# Patient Record
Sex: MALE | Race: WHITE | NOT HISPANIC OR LATINO | Employment: OTHER | URBAN - METROPOLITAN AREA
[De-identification: names, ages, dates, MRNs, and addresses within clinical notes are randomized per-mention and may not be internally consistent; named-entity substitution may affect disease eponyms.]

---

## 2018-05-21 ENCOUNTER — HOSPITAL ENCOUNTER (EMERGENCY)
Facility: HOSPITAL | Age: 48
Discharge: HOME | End: 2018-05-21
Attending: EMERGENCY MEDICINE | Admitting: EMERGENCY MEDICINE

## 2018-05-21 ENCOUNTER — APPOINTMENT (OUTPATIENT)
Dept: CT IMAGING | Facility: HOSPITAL | Age: 48
End: 2018-05-21

## 2018-05-21 VITALS
SYSTOLIC BLOOD PRESSURE: 149 MMHG | TEMPERATURE: 98.1 F | RESPIRATION RATE: 16 BRPM | WEIGHT: 165 LBS | HEIGHT: 70 IN | BODY MASS INDEX: 23.62 KG/M2 | HEART RATE: 71 BPM | DIASTOLIC BLOOD PRESSURE: 88 MMHG | OXYGEN SATURATION: 99 %

## 2018-05-21 DIAGNOSIS — R56.9 NEW ONSET SEIZURE (HCC): Primary | ICD-10-CM

## 2018-05-21 LAB
ALBUMIN SERPL-MCNC: 4.6 G/DL (ref 3.5–5.2)
ALBUMIN/GLOB SERPL: 1.6 G/DL
ALP SERPL-CCNC: 65 U/L (ref 39–117)
ALT SERPL W P-5'-P-CCNC: 23 U/L (ref 1–41)
ANION GAP SERPL CALCULATED.3IONS-SCNC: 13 MMOL/L
AST SERPL-CCNC: 13 U/L (ref 1–40)
BACTERIA UR QL AUTO: ABNORMAL /HPF
BASOPHILS # BLD AUTO: 0.02 10*3/MM3 (ref 0–0.2)
BASOPHILS NFR BLD AUTO: 0.2 % (ref 0–1.5)
BILIRUB SERPL-MCNC: 0.4 MG/DL (ref 0.1–1.2)
BILIRUB UR QL STRIP: NEGATIVE
BUN BLD-MCNC: 10 MG/DL (ref 6–20)
BUN/CREAT SERPL: 11.2 (ref 7–25)
CALCIUM SPEC-SCNC: 9.6 MG/DL (ref 8.6–10.5)
CHLORIDE SERPL-SCNC: 103 MMOL/L (ref 98–107)
CLARITY UR: CLEAR
CO2 SERPL-SCNC: 26 MMOL/L (ref 22–29)
COLOR UR: YELLOW
CREAT BLD-MCNC: 0.89 MG/DL (ref 0.76–1.27)
DEPRECATED RDW RBC AUTO: 43.1 FL (ref 37–54)
EOSINOPHIL # BLD AUTO: 0.17 10*3/MM3 (ref 0–0.7)
EOSINOPHIL NFR BLD AUTO: 1.7 % (ref 0.3–6.2)
ERYTHROCYTE [DISTWIDTH] IN BLOOD BY AUTOMATED COUNT: 12.7 % (ref 11.5–14.5)
GFR SERPL CREATININE-BSD FRML MDRD: 92 ML/MIN/1.73
GLOBULIN UR ELPH-MCNC: 2.9 GM/DL
GLUCOSE BLD-MCNC: 106 MG/DL (ref 65–99)
GLUCOSE UR STRIP-MCNC: NEGATIVE MG/DL
HCT VFR BLD AUTO: 43.2 % (ref 40.4–52.2)
HGB BLD-MCNC: 14.5 G/DL (ref 13.7–17.6)
HGB UR QL STRIP.AUTO: NEGATIVE
HYALINE CASTS UR QL AUTO: ABNORMAL /LPF
IMM GRANULOCYTES # BLD: 0.02 10*3/MM3 (ref 0–0.03)
IMM GRANULOCYTES NFR BLD: 0.2 % (ref 0–0.5)
INR PPP: 1.01 (ref 0.9–1.1)
KETONES UR QL STRIP: NEGATIVE
LEUKOCYTE ESTERASE UR QL STRIP.AUTO: ABNORMAL
LYMPHOCYTES # BLD AUTO: 1.06 10*3/MM3 (ref 0.9–4.8)
LYMPHOCYTES NFR BLD AUTO: 10.5 % (ref 19.6–45.3)
MCH RBC QN AUTO: 31.5 PG (ref 27–32.7)
MCHC RBC AUTO-ENTMCNC: 33.6 G/DL (ref 32.6–36.4)
MCV RBC AUTO: 93.7 FL (ref 79.8–96.2)
MONOCYTES # BLD AUTO: 0.7 10*3/MM3 (ref 0.2–1.2)
MONOCYTES NFR BLD AUTO: 7 % (ref 5–12)
NEUTROPHILS # BLD AUTO: 8.08 10*3/MM3 (ref 1.9–8.1)
NEUTROPHILS NFR BLD AUTO: 80.4 % (ref 42.7–76)
NITRITE UR QL STRIP: NEGATIVE
PH UR STRIP.AUTO: <=5 [PH] (ref 5–8)
PLATELET # BLD AUTO: 224 10*3/MM3 (ref 140–500)
PMV BLD AUTO: 11.3 FL (ref 6–12)
POTASSIUM BLD-SCNC: 4.2 MMOL/L (ref 3.5–5.2)
PROT SERPL-MCNC: 7.5 G/DL (ref 6–8.5)
PROT UR QL STRIP: ABNORMAL
PROTHROMBIN TIME: 13.1 SECONDS (ref 11.7–14.2)
RBC # BLD AUTO: 4.61 10*6/MM3 (ref 4.6–6)
RBC # UR: ABNORMAL /HPF
REF LAB TEST METHOD: ABNORMAL
SODIUM BLD-SCNC: 142 MMOL/L (ref 136–145)
SP GR UR STRIP: 1.02 (ref 1–1.03)
SPERM URNS QL MICRO: ABNORMAL /HPF
SQUAMOUS #/AREA URNS HPF: ABNORMAL /HPF
TROPONIN T SERPL-MCNC: <0.01 NG/ML (ref 0–0.03)
UROBILINOGEN UR QL STRIP: ABNORMAL
WBC NRBC COR # BLD: 10.05 10*3/MM3 (ref 4.5–10.7)
WBC UR QL AUTO: ABNORMAL /HPF

## 2018-05-21 PROCEDURE — 25010000002 ONDANSETRON PER 1 MG: Performed by: EMERGENCY MEDICINE

## 2018-05-21 PROCEDURE — 81001 URINALYSIS AUTO W/SCOPE: CPT | Performed by: EMERGENCY MEDICINE

## 2018-05-21 PROCEDURE — 93010 ELECTROCARDIOGRAM REPORT: CPT | Performed by: INTERNAL MEDICINE

## 2018-05-21 PROCEDURE — 70450 CT HEAD/BRAIN W/O DYE: CPT

## 2018-05-21 PROCEDURE — 96374 THER/PROPH/DIAG INJ IV PUSH: CPT

## 2018-05-21 PROCEDURE — 85025 COMPLETE CBC W/AUTO DIFF WBC: CPT | Performed by: EMERGENCY MEDICINE

## 2018-05-21 PROCEDURE — 99285 EMERGENCY DEPT VISIT HI MDM: CPT

## 2018-05-21 PROCEDURE — 87086 URINE CULTURE/COLONY COUNT: CPT | Performed by: EMERGENCY MEDICINE

## 2018-05-21 PROCEDURE — 96375 TX/PRO/DX INJ NEW DRUG ADDON: CPT

## 2018-05-21 PROCEDURE — 80053 COMPREHEN METABOLIC PANEL: CPT | Performed by: EMERGENCY MEDICINE

## 2018-05-21 PROCEDURE — 96361 HYDRATE IV INFUSION ADD-ON: CPT

## 2018-05-21 PROCEDURE — 25010000002 MORPHINE PER 10 MG: Performed by: EMERGENCY MEDICINE

## 2018-05-21 PROCEDURE — 25010000003 LEVETIRACETAM IN NACL 0.75% 1000 MG/100ML SOLUTION: Performed by: EMERGENCY MEDICINE

## 2018-05-21 PROCEDURE — 85610 PROTHROMBIN TIME: CPT | Performed by: EMERGENCY MEDICINE

## 2018-05-21 PROCEDURE — 84484 ASSAY OF TROPONIN QUANT: CPT | Performed by: EMERGENCY MEDICINE

## 2018-05-21 PROCEDURE — 93005 ELECTROCARDIOGRAM TRACING: CPT | Performed by: EMERGENCY MEDICINE

## 2018-05-21 RX ORDER — LEVETIRACETAM 10 MG/ML
1000 INJECTION INTRAVASCULAR ONCE
Status: COMPLETED | OUTPATIENT
Start: 2018-05-21 | End: 2018-05-21

## 2018-05-21 RX ORDER — IBUPROFEN 200 MG
800 TABLET ORAL ONCE
Status: COMPLETED | OUTPATIENT
Start: 2018-05-21 | End: 2018-05-21

## 2018-05-21 RX ORDER — DOCUSATE SODIUM 100 MG/1
100 CAPSULE, LIQUID FILLED ORAL 2 TIMES DAILY
COMMUNITY

## 2018-05-21 RX ORDER — MORPHINE SULFATE 2 MG/ML
2 INJECTION, SOLUTION INTRAMUSCULAR; INTRAVENOUS ONCE
Status: COMPLETED | OUTPATIENT
Start: 2018-05-21 | End: 2018-05-21

## 2018-05-21 RX ORDER — ACETAMINOPHEN 325 MG/1
650 TABLET ORAL EVERY 6 HOURS PRN
COMMUNITY

## 2018-05-21 RX ORDER — FOLIC ACID 1 MG/1
1 TABLET ORAL DAILY
COMMUNITY

## 2018-05-21 RX ORDER — BACLOFEN 10 MG/1
10 TABLET ORAL 3 TIMES DAILY
COMMUNITY

## 2018-05-21 RX ORDER — LABETALOL 200 MG/1
200 TABLET, FILM COATED ORAL 2 TIMES DAILY
COMMUNITY

## 2018-05-21 RX ORDER — ATORVASTATIN CALCIUM 40 MG/1
40 TABLET, FILM COATED ORAL DAILY
COMMUNITY

## 2018-05-21 RX ORDER — SERTRALINE HYDROCHLORIDE 100 MG/1
100 TABLET, FILM COATED ORAL DAILY
COMMUNITY

## 2018-05-21 RX ORDER — TRAZODONE HYDROCHLORIDE 50 MG/1
50 TABLET ORAL NIGHTLY
COMMUNITY

## 2018-05-21 RX ORDER — LEVETIRACETAM 500 MG/1
500 TABLET ORAL 2 TIMES DAILY
Qty: 60 TABLET | Refills: 1 | Status: SHIPPED | OUTPATIENT
Start: 2018-05-21

## 2018-05-21 RX ORDER — AMANTADINE HYDROCHLORIDE 100 MG/1
100 CAPSULE, GELATIN COATED ORAL 2 TIMES DAILY
COMMUNITY

## 2018-05-21 RX ORDER — LOSARTAN POTASSIUM AND HYDROCHLOROTHIAZIDE 25; 100 MG/1; MG/1
1 TABLET ORAL DAILY
COMMUNITY

## 2018-05-21 RX ORDER — UBIDECARENONE 100 MG
100 CAPSULE ORAL DAILY
COMMUNITY

## 2018-05-21 RX ORDER — SENNOSIDES 8.6 MG
TABLET ORAL NIGHTLY
COMMUNITY

## 2018-05-21 RX ORDER — SODIUM CHLORIDE 0.9 % (FLUSH) 0.9 %
10 SYRINGE (ML) INJECTION AS NEEDED
Status: DISCONTINUED | OUTPATIENT
Start: 2018-05-21 | End: 2018-05-21 | Stop reason: HOSPADM

## 2018-05-21 RX ORDER — IBUPROFEN 400 MG/1
400 TABLET ORAL EVERY 6 HOURS PRN
COMMUNITY
End: 2019-07-15

## 2018-05-21 RX ORDER — ONDANSETRON 2 MG/ML
4 INJECTION INTRAMUSCULAR; INTRAVENOUS ONCE
Status: COMPLETED | OUTPATIENT
Start: 2018-05-21 | End: 2018-05-21

## 2018-05-21 RX ADMIN — LEVETIRACETAM 1000 MG: 10 INJECTION INTRAVENOUS at 11:48

## 2018-05-21 RX ADMIN — ONDANSETRON 4 MG: 2 INJECTION INTRAMUSCULAR; INTRAVENOUS at 10:23

## 2018-05-21 RX ADMIN — MORPHINE SULFATE 2 MG: 2 INJECTION, SOLUTION INTRAMUSCULAR; INTRAVENOUS at 10:23

## 2018-05-21 RX ADMIN — IBUPROFEN 800 MG: 200 TABLET, FILM COATED ORAL at 11:26

## 2018-05-21 RX ADMIN — SODIUM CHLORIDE 1000 ML: 9 INJECTION, SOLUTION INTRAVENOUS at 09:49

## 2018-05-21 NOTE — ED TRIAGE NOTES
Patient to er from assisted living with c/o seizure lasting aprox 3 minutes. Patient has hx of seizure and hx of TBI in the past. Patient c/o left chest pain. Patient transported to er per [facility]

## 2018-05-21 NOTE — ED PROVIDER NOTES
EMERGENCY DEPARTMENT ENCOUNTER    CHIEF COMPLAINT  Chief Complaint: Seizure  History given by: Patient  History limited by: None  Room Number: 36/36  PMD: Arturo Flores DO      HPI:  Pt is a 47 y.o. male who presents after experiencing a witnessed 3-minute seizure earlier today. Patient states that following the episode he developed a mild HA and L-sided chest pain that is exacerbated by deep breathing and movement. There are no c/o  incontinence, tongue pain or other symptoms. 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   Treatment before arrival: none stated    PAST MEDICAL HISTORY  Active Ambulatory Problems     Diagnosis Date Noted   • No Active Ambulatory Problems     Resolved Ambulatory Problems     Diagnosis Date Noted   • No Resolved Ambulatory Problems     Past Medical History:   Diagnosis Date   • Hypertension    • Stroke        PAST SURGICAL HISTORY  Past Surgical History:   Procedure Laterality Date   • BRAIN SURGERY         FAMILY HISTORY  History reviewed. No pertinent family history.    SOCIAL HISTORY  Social History     Social History   • Marital status: Single     Spouse name: N/A   • Number of children: N/A   • Years of education: N/A     Occupational History   • Not on file.     Social History Main Topics   • Smoking status: Never Smoker   • Smokeless tobacco: Not on file   • Alcohol use No   • Drug use: Unknown   • Sexual activity: Not on file     Other Topics Concern   • Not on file      Social History Narrative   • No narrative on file       ALLERGIES  Aspartame and phenylalanine    REVIEW OF SYSTEMS  Review of Systems   Constitutional: Negative for activity change, appetite change and fever.   HENT: Negative for congestion and sore throat.         No tongue pain/injury   Eyes: Negative.    Respiratory: Negative for cough and shortness of breath.    Cardiovascular: Positive for chest pain (L-sided chest pain exacerbated by deep breathing and movement). Negative for leg swelling.   Gastrointestinal: Negative for abdominal pain, diarrhea and vomiting.   Endocrine: Negative.    Genitourinary: Negative for decreased urine volume and dysuria.        No incontinence   Musculoskeletal: Negative for neck pain.   Skin: Negative for rash and wound.   Allergic/Immunologic: Negative.    Neurological: Positive for seizures and headaches (mild). Negative for weakness and numbness.   Hematological: Negative.    Psychiatric/Behavioral: Negative.    All other systems reviewed and are negative.      PHYSICAL EXAM  ED Triage Vitals   Temp Heart Rate Resp BP SpO2   05/21/18 0917 05/21/18 0909 05/21/18 0918 05/21/18 0909 05/21/18 0909   98.9 °F (37.2 °C) 91 22 139/90 96 %      Temp src Heart Rate Source Patient Position BP Location FiO2 (%)   05/21/18 0917 -- -- -- --   Tympanic           Physical Exam   Constitutional: He is oriented to person, place, and time and well-developed, well-nourished, and in no distress.   HENT:   Head: Normocephalic and atraumatic.   Eyes: EOM are normal. Pupils are equal, round, and reactive to light.   Neck: Normal range of motion. Neck supple.   Cardiovascular: Normal rate, regular rhythm and normal heart sounds.    No murmur heard.  Pulmonary/Chest: Effort normal and breath sounds normal. No respiratory distress. He exhibits tenderness (reproducible with palpation and worse with flexion of his back).   Abdominal: Soft. There is no tenderness. There is no rebound and no guarding.    Musculoskeletal: Normal range of motion. He exhibits no edema.   Neurological: He is alert and oriented to person, place, and time. He has normal sensation and normal strength.   Skin: Skin is warm and dry.   Psychiatric: Mood and affect normal.   Nursing note and vitals reviewed.      LAB RESULTS  Lab Results (last 24 hours)     Procedure Component Value Units Date/Time    CBC & Differential [[id number]] Collected:  05/21/18 0939    Specimen:  Blood Updated:  05/21/18 0953    Narrative:       The following orders were created for panel order CBC & Differential.  Procedure                               Abnormality         Status                     ---------                               -----------         ------                     CBC Auto Differential[[id number]]        Abnormal            Final result                 Please view results for these tests on the individual orders.    Comprehensive Metabolic Panel [[id number]]  (Abnormal) Collected:  05/21/18 0939    Specimen:  Blood Updated:  05/21/18 1017     Glucose 106 (H) mg/dL      BUN 10 mg/dL      Creatinine 0.89 mg/dL      Sodium 142 mmol/L      Potassium 4.2 mmol/L      Chloride 103 mmol/L      CO2 26.0 mmol/L      Calcium 9.6 mg/dL      Total Protein 7.5 g/dL      Albumin 4.60 g/dL      ALT (SGPT) 23 U/L      AST (SGOT) 13 U/L      Alkaline Phosphatase 65 U/L      Total Bilirubin 0.4 mg/dL      eGFR Non African Amer 92 mL/min/1.73      Globulin 2.9 gm/dL      A/G Ratio 1.6 g/dL      BUN/Creatinine Ratio 11.2     Anion Gap 13.0 mmol/L     Protime-INR [[id number]]  (Normal) Collected:  05/21/18 0939    Specimen:  Blood Updated:  05/21/18 1002     Protime 13.1 Seconds      INR 1.01    Troponin [[id number]]  (Normal) Collected:  05/21/18 0939    Specimen:  Blood Updated:  05/21/18 1017     Troponin T <0.010 ng/mL     Narrative:       Troponin T Reference Ranges:  Less than 0.03 ng/mL:    Negative for AMI  0.03 to 0.09 ng/mL:      Indeterminant for AMI  Greater  than 0.09 ng/mL: Positive for AMI    CBC Auto Differential [[id number]]  (Abnormal) Collected:  05/21/18 0939    Specimen:  Blood Updated:  05/21/18 0953     WBC 10.05 10*3/mm3      RBC 4.61 10*6/mm3      Hemoglobin 14.5 g/dL      Hematocrit 43.2 %      MCV 93.7 fL      MCH 31.5 pg      MCHC 33.6 g/dL      RDW 12.7 %      RDW-SD 43.1 fl      MPV 11.3 fL      Platelets 224 10*3/mm3      Neutrophil % 80.4 (H) %      Lymphocyte % 10.5 (L) %      Monocyte % 7.0 %      Eosinophil % 1.7 %      Basophil % 0.2 %      Immature Grans % 0.2 %      Neutrophils, Absolute 8.08 10*3/mm3      Lymphocytes, Absolute 1.06 10*3/mm3      Monocytes, Absolute 0.70 10*3/mm3      Eosinophils, Absolute 0.17 10*3/mm3      Basophils, Absolute 0.02 10*3/mm3      Immature Grans, Absolute 0.02 10*3/mm3     Urinalysis With / Culture If Indicated - Urine, Clean Catch [[id number]]  (Abnormal) Collected:  05/21/18 1127    Specimen:  Urine from Urine, Clean Catch Updated:  05/21/18 1151     Color, UA Yellow     Appearance, UA Clear     pH, UA <=5.0     Specific Gravity, UA 1.019     Glucose, UA Negative     Ketones, UA Negative     Bilirubin, UA Negative     Blood, UA Negative     Protein, UA Trace (A)     Leuk Esterase, UA Trace (A)     Nitrite, UA Negative     Urobilinogen, UA 0.2 E.U./dL    Urinalysis, Microscopic Only - Urine, Clean Catch [[id number]]  (Abnormal) Collected:  05/21/18 1127    Specimen:  Urine from Urine, Clean Catch Updated:  05/21/18 1200     RBC, UA 3-5 (A) /HPF      WBC, UA 6-12 (A) /HPF      Bacteria, UA None Seen /HPF      Squamous Epithelial Cells, UA 0-2 /HPF      Hyaline Casts, UA 13-20 /LPF      Sperm, UA Moderate/2+ (A) /HPF      Methodology Manual Light Microscopy    Urine Culture - Urine, Urine, Clean Catch [[id number]] Collected:  05/21/18 1127    Specimen:  Urine from Urine, Clean Catch Updated:  05/21/18 1137          I ordered the above labs and reviewed the results    RADIOLOGY  CT Head Without Contrast   Preliminary  Result   1. No acute abnormality is seen to account for the new seizure.   2. This patient has had a prior 4 cm posterior left parietal craniotomy   to by history resect a brain tumor and there is a 2 cm area of   encephalomalacia deep to the craniotomy flap. Furthermore. There is a   moderate size area of cystic encephalomalacia that tracks throughout the   left corona radiata region, the left internal capsule, external capsule,   and lateral left putamen into the superior and anterior left temporal   lobe that tracks up to 6.7 x 1.7 x 4 cm in anterior posterior, medial   lateral, and craniocaudal dimension likely secondary to old areas of   left middle cerebral artery territory infarction. Some of it could   potentially be the sequela of an old hemorrhage, correlate with clinical   history. There is wallerian degeneration white matter tracts tracking   down the left cerebral peduncle, left anterior guzman and medulla. There   is a 6 mm old lacunar infarct in the posterior right putamen.       3. Postsurgical changes inferior to the left orbit with what appears to   be prior resection of the left maxillary sinus and left middle and   inferior turbinectomy and partial left ethmoidectomy. There are multiple   surgical clips and there is some soft tissue thickening at the surgical   site and soft tissue thickening in the left pterygopalatine fossa and of   course areas of residual tumor cannot be excluded. Correlate with   clinical history with regards to this finding. 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   4. The remainder of the head CT is within normal limits. If clinically   indicated, an MRI of the brain with and without contrast could be   obtained for more comprehensive assessment.        The results and recommendations were communicated to Collins Lewis MD, in   the emergency room by telephone  05/21/2018 at 10:30 AM.       Radiation dose reduction techniques were utilized, including automated   exposure control and exposure modulation based on body size.                   I ordered the above noted radiological studies. Interpreted by radiologist. Reviewed by me in PACS.       PROCEDURES  Procedures    EKG           EKG time: 0935  Rhythm/Rate: NSR rate77  P waves and MO: nml  QRS, axis: nml   ST and T waves: nml     Interpreted Contemporaneously by me, independently viewed  No previous ECG for comparison      PROGRESS AND CONSULTS      9:30 AM   Ordered IVF for hydration.     10:02 AM  Ordered zofran for nausea/vomiting prevention and morphine for chest pain.     11:13 AM  Discussed patient's case with Dr. Valiente, [facility], including concerns regarding patient's h/o aphasia and basal ganglia as well as presentation today s/p seizure and pertinent workup which is not acute. He is not convinced that patient had a seizure since CO2 is normal and patient has had no postictal symptoms. He recommends speaking to neurology to see if patient requires admission. I will reevaluate.    11:35 AM  Discussed patient's case with Dr. Aguilar, neurology, including concerns regarding patient's h/o brain surgery and presentation with seizure (including specifics of episode and post-ictal activity). He recommends starting patient on keppra and discharging to f/u with PCP.     11:47 AM  Rechecked patient who is resting comfortably in NAD and with stable VS. Informed patient of pertinent workup which shows prior infarct but nothing acute. Discussed plan developed with neurology to d/c with a prescription for keppra. Due to patient's h/o stroke, it is not unlikely for him to experience seizures. Pt understands and agrees with the plan. All questions answered.    MEDICAL DECISION MAKING  Results were reviewed/discussed with the patient and they were also made aware of online access. Pt also made aware that some labs, such as cultures,  will not be resulted during ER visit and follow up with PMD is necessary.     MDM  Number of Diagnoses or Management Options     Amount and/or Complexity of Data Reviewed  Clinical lab tests: ordered and reviewed (Lab results are unremarkable)  Tests in the radiology section of CPT®: ordered and reviewed (CT Head is not acute)  Tests in the medicine section of CPT®: reviewed and ordered (EKG - See EKG procedure note)  Decide to obtain previous medical records or to obtain history from someone other than the patient: yes    Patient Progress  Patient progress: stable (Unchanged)         DIAGNOSIS  Final diagnoses:   New onset seizure       DISPOSITION  DISCHARGE    Patient discharged in stable condition.    Reviewed implications of results, diagnosis, meds, responsibility to follow up, warning signs and symptoms of possible worsening, potential complications and reasons to return to ER, including new or worsening sx.    Patient/Family voiced understanding of above instructions.    Discussed plan for discharge, as there is no emergent indication for admission. Patient referred to primary care provider for BP management due to today's BP. Pt/family is agreeable and understands need for follow up and repeat testing.  Pt is aware that discharge does not mean that nothing is wrong but it indicates no emergency is present that requires admission and they must continue care with follow-up as given below or physician of their choice.     FOLLOW-UP  Arturo Flores DO  [address]  [facility] [address]  [phone]    Schedule an appointment as soon as possible for a visit       [facility]  [address]  [facility] [id number] [phone]  Schedule an appointment as soon as possible for a visit            Medication List      New Prescriptions    levETIRAcetam 500 MG tablet  Commonly known as:  KEPPRA  Take 1 tablet by mouth 2 (Two) Times a Day.               Latest Documented Vital Signs:  As of 12:24 PM  BP- 148/87 HR- 77 Temp- 98.9 °F (37.2 °C) (Tympanic) O2 sat- 97%    --  Documentation assistance provided by shirley Vences for Dr. Lewis.  Information recorded by the scribe was done at my direction and has been verified and validated by me.       Shasha Vences  05/21/18 1223       Johnny Lewis MD  05/21/18 3285

## 2018-05-21 NOTE — ED NOTES
Asked pt if he could urinate, he said no. Told him to call out when he could.      Charanjit Karimi RN  05/21/18 6490

## 2018-05-23 LAB — BACTERIA SPEC AEROBE CULT: NO GROWTH

## 2019-07-08 ENCOUNTER — TREATMENT (OUTPATIENT)
Dept: PHYSICAL THERAPY | Facility: CLINIC | Age: 49
End: 2019-07-08

## 2019-07-08 DIAGNOSIS — I69.398 ABNORMALITY OF GAIT FOLLOWING CEREBROVASCULAR ACCIDENT (CVA): Primary | ICD-10-CM

## 2019-07-08 DIAGNOSIS — R53.1 RIGHT SIDED WEAKNESS: ICD-10-CM

## 2019-07-08 DIAGNOSIS — R26.9 ABNORMALITY OF GAIT FOLLOWING CEREBROVASCULAR ACCIDENT (CVA): Primary | ICD-10-CM

## 2019-07-08 PROCEDURE — 97161 PT EVAL LOW COMPLEX 20 MIN: CPT | Performed by: PHYSICAL THERAPIST

## 2019-07-08 NOTE — PROGRESS NOTES
Physical Therapy Initial Evaluation and Plan of Care    Patient Name: Trevor Baez       Patient MRN: [id number]  : 1970  Physician: Olegario Flores MD  Date: 2019    Encounter Diagnoses   Name Primary?   • Abnormality of gait following cerebrovascular accident (CVA) Yes   • Right sided weakness      Subjective Evaluation    History of Present Illness  Date of onset: 3/19/2017  Mechanism of injury: Patient has aphasia from TBI, left basal ganglia hemorrhage affecting R side of body. Wearing R hinged knee brace, R AFO for foot drop. No active movement of R UE except for scapular shrug. R hand is in resting hand splint.     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 S/P craniotomy.     Difficult to understand patient. He reports he has not fallen but has near falls. Walks with single point cane at home. Does not walk without braces on LE.     Subjective comment: ABC: 47% balance confidence  Patient Occupation: does not work Quality of life: fair    Pain  No pain reported    Social Support  [facility] at[facility]    Hand dominance: right    Diagnostic Tests  CT scan: abnormal (see results in chart)    Treatments  Previous treatment: speech therapy and physical therapy  Current treatment: speech therapy  Patient Goals  Patient goals for therapy: improved balance, increased strength and independence with ADLs/IADLs             Objective       Strength/Myotome Testing     Left Hip   Planes of Motion   Flexion: 4+    Right Hip   Planes of Motion   Flexion: 3-  Extension: 3    Left Knee   Flexion: 4+  Extension: 4+    Right Knee   Flexion: 0  Extension: 3-    Left Ankle/Foot   Dorsiflexion: 4+    Additional Strength Details  Wearing AFO on R: unable to test    Ambulation   Weight-Bearing Status   Assistive device used: quad cane    Additional Weight-Bearing Status Details  Cane in L hand    Ambulation: Level Surfaces   Ambulation with assistive  device: independent  Ambulation without assistive device: unable    Observational Gait   Gait: asymmetric   Decreased walking speed, stride length and right stance time.     Quality of Movement During Gait     Hip    Hip (Right): Positive hike.     Comments   Static balance: standing FA firm surface can stand without LOB for 30 sec. FT firm surface can stand for 4 sec before LOB           Assessment & Plan     Assessment  Impairments: abnormal coordination, abnormal gait, abnormal muscle firing, abnormal muscle tone, abnormal or restricted ROM, activity intolerance, impaired balance, impaired physical strength, lacks appropriate home exercise program and safety issue  Assessment details: Patient is a good candidate for PT to address the listed impairments and functional limitations.     Prognosis: fair  Functional Limitations: carrying objects, walking, pulling, pushing, standing and stooping  Goals  Plan Goals: STG: To be met in 4 weeks:  1. Patient is independent with HEP of static/dynamic and ADL incorporated challenges.  2. Patient can stand FT firm surface without loss of balance for 30 sec.   3. Patient denies falls.   LTG: To be met in 8 weeks:   1. R hip flex and ext MMT is at least 4-/5.  2. Patient can ambulate community distances with AD safely to improve independence.   3. Patient's ABC score is at least 60% balance confidence.       Plan  Therapy options: will be seen for skilled physical therapy services  Planned therapy interventions: gait training, flexibility, functional ROM exercises, home exercise program, balance/weight-bearing training, manual therapy, neuromuscular re-education, strengthening, stretching and therapeutic activities  Frequency: 3x week  Duration in visits: 8  Treatment plan discussed with: patient        See Exercise, Manual, and Modality Logs for complete treatment.     PROCEDURES AND MODALITIES:  Paraffin:   pre-rx  Moist Heat:    Ice:   post-rx  E-Stim:    Ultrasound:     Ionto:   Traction:    Dry Needling:         Therapy Exercise 46596 12 minutes Unable to charge as PT has Medicaid     Timed Code Treatment: 12 Minutes  Total Treatment Time: 40 Minutes    PT SIGNATURE: Dominique Low PT, DPT, CHT  [place] License # [id number]  DATE TREATMENT INITIATED: 7/8/2019    Initial Certification  Certification Period: 10/6/2019  I certify that the therapy services are furnished while this patient is under my care.  The services outlined above are required by this patient, and will be reviewed every 90 days.     PHYSICIAN:       DATE:     Please sign and return via fax to [phone].. Thank you, [facility].

## 2019-07-15 ENCOUNTER — HOSPITAL ENCOUNTER (EMERGENCY)
Facility: HOSPITAL | Age: 49
Discharge: HOME | End: 2019-07-15
Attending: EMERGENCY MEDICINE | Admitting: EMERGENCY MEDICINE

## 2019-07-15 ENCOUNTER — APPOINTMENT (OUTPATIENT)
Dept: GENERAL RADIOLOGY | Facility: HOSPITAL | Age: 49
End: 2019-07-15

## 2019-07-15 ENCOUNTER — APPOINTMENT (OUTPATIENT)
Dept: PHYSICAL THERAPY | Facility: HOSPITAL | Age: 49
End: 2019-07-15

## 2019-07-15 VITALS
TEMPERATURE: 97 F | DIASTOLIC BLOOD PRESSURE: 83 MMHG | RESPIRATION RATE: 16 BRPM | HEART RATE: 67 BPM | BODY MASS INDEX: 27.49 KG/M2 | HEIGHT: 65 IN | SYSTOLIC BLOOD PRESSURE: 124 MMHG | WEIGHT: 165 LBS | OXYGEN SATURATION: 98 %

## 2019-07-15 DIAGNOSIS — M54.5 LOW BACK PAIN, UNSPECIFIED BACK PAIN LATERALITY, UNSPECIFIED CHRONICITY, WITH SCIATICA PRESENCE UNSPECIFIED: Primary | ICD-10-CM

## 2019-07-15 PROCEDURE — 72110 X-RAY EXAM L-2 SPINE 4/>VWS: CPT

## 2019-07-15 PROCEDURE — 99283 EMERGENCY DEPT VISIT LOW MDM: CPT

## 2019-07-15 RX ORDER — CYCLOBENZAPRINE HCL 10 MG
10 TABLET ORAL ONCE
Status: COMPLETED | OUTPATIENT
Start: 2019-07-15 | End: 2019-07-15

## 2019-07-15 RX ORDER — IBUPROFEN 800 MG/1
800 TABLET ORAL ONCE
Status: COMPLETED | OUTPATIENT
Start: 2019-07-15 | End: 2019-07-15

## 2019-07-15 RX ORDER — IBUPROFEN 600 MG/1
600 TABLET ORAL EVERY 6 HOURS PRN
Qty: 20 TABLET | Refills: 0 | Status: SHIPPED | OUTPATIENT
Start: 2019-07-15

## 2019-07-15 RX ADMIN — IBUPROFEN 800 MG: 800 TABLET, FILM COATED ORAL at 11:26

## 2019-07-15 RX ADMIN — CYCLOBENZAPRINE 10 MG: 10 TABLET, FILM COATED ORAL at 11:28

## 2019-07-17 ENCOUNTER — HOSPITAL ENCOUNTER (OUTPATIENT)
Dept: PHYSICAL THERAPY | Facility: HOSPITAL | Age: 49
Setting detail: THERAPIES SERIES
Discharge: HOME | End: 2019-07-17

## 2019-07-17 DIAGNOSIS — I69.398 ABNORMALITY OF GAIT FOLLOWING CEREBROVASCULAR ACCIDENT (CVA): Primary | ICD-10-CM

## 2019-07-17 DIAGNOSIS — R26.9 ABNORMALITY OF GAIT FOLLOWING CEREBROVASCULAR ACCIDENT (CVA): Primary | ICD-10-CM

## 2019-07-17 DIAGNOSIS — R53.1 RIGHT SIDED WEAKNESS: ICD-10-CM

## 2019-07-17 PROCEDURE — 97112 NEUROMUSCULAR REEDUCATION: CPT

## 2019-07-17 NOTE — THERAPY EVALUATION
.Outpatient Physical Therapy Neuro Initial Evaluation  [facility]     Patient Name: Trevor Baez  : 1970  MRN: [id number]  Today's Date: 2019      Visit Date: 2019    There is no problem list on file for this patient.       Past Medical History:   Diagnosis Date   • Hypertension    • Stroke (CMS/HCC)         Past Surgical History:   Procedure Laterality Date   • BRAIN SURGERY           Visit Dx:     ICD-10-CM ICD-9-CM   1. Abnormality of gait following cerebrovascular accident (CVA) I69.398 438.89    R26.9 781.2   2. 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 Pt has had some therapy since injury. Pt referred for outpt PT.   -MARTIN      Previous treatment for THIS PROBLEM  Rehabilitation  -MARTIN      Patient/Caregiver Goals  Improve mobility  -MARTIN      Occupation/sports/leisure activities  Pt was an  prior to his TBI.   -MARTIN      Patient seeing anyone else for problem(s)?  Pt receives OT and ST at [facility].   -MARTIN         Pain     Pain at Present  0  -MARTIN        User Key  (r) = Recorded By, (t) = Taken By, (c) = Cosigned By    Initials Name Provider Type    Jill Toth PT Physical Therapist              PT Neuro     Row Name 19 1100             Subjective Comments    Subjective Comments  Pt said he wears his leg brace/AFO when up ambulating and uses quad cane but sometimes no device.   -MARTIN         Precautions and Contraindications     Precautions/Limitations  fall precautions  -MARTIN      Precautions  Pt is aphasic, seems to understand but difficulty with word finding. Absent R side sensation. R visual field cut.   -MARTIN         Subjective Pain    Able to rate subjective pain?  yes  -MARTIN      Pre-Treatment Pain Level  0  -MARTIN      Post-Treatment Pain Level  0  -MARTIN         Home Living    Living Arrangements  group home through [facility]   -MARTIN      Home Accessibility  stairs to enter home  -MARTIN      Number of Stairs, Main Entrance  two;three  -MARTIN      Stair Railings, Main Entrance  railing on right side (ascending)  -MARTIN      Home Equipment  Quad cane;Wheelchair-manual  -MARTIN         Vision-Basic Assessment    Current Vision  Wears glasses all the time Pt has R visual field cut since TBI   -MARTIN         Cognition    Overall Cognitive Status  WFL  -MARTIN      Memory  Decreased recall of biographical information  -MARTIN      Orientation Level  Oriented to person;Oriented to time;Oriented to situation  -MARTIN      Safety Judgment  Decreased awareness of need for safety  -MARTIN      Deficits  Fully aware of deficits  -MARTIN      Comments  Pt said sometimes he is up ambulating without his cane.   -MARTIN         Sensation    Light Touch  -- absent sensation R extremities  -MARTIN         Posture/Observations    Posture/Observations Comments  Pt is well nourished male who arrived ambulating with SBQC, AFO with DonJoy knee brace R LE with caregiver from [facility] present.   -MARTIN         General ROM    GENERAL ROM COMMENTS  AROM WFLs L extremities; R UE: hypertonic with flexed hand. R LE: overall PROM WFLs  -MARTIN         MMT (Manual Muscle Testing)    Rt Upper Ext  Comments  -MARTIN      Lt Upper Ext  Comments  -MARTIN      Rt Lower Ext  Rt Hip Flexion;Rt Hip Extension;Rt Hip ABduction;Rt Knee Extension;Rt Knee Flexion;Rt Ankle Plantarflexion;Rt Ankle Dorsiflexion;Rt Ankle Subtalar Inversion;Rt Ankle Subtalar Eversion  -MARTIN      Lt Lower Ext  Comments  -MARTIN         MMT Right Upper Ext     Rt Upper Extremity Comments   No functional movement, hand remains flexed in fist.    -MARTIN         MMT Left Upper Ext    Lt Upper Extremity Comments   overall 4+/5  -MARTIN         MMT Right Lower Ext    Rt Hip Flexion MMT, Gross Movement  (3-/5) fair minus  -MARTIN      Rt Hip Extension MMT, Gross Movement  other (see comments) 1/2 bridge 10% range   -MARTIN      Rt Hip ABduction MMT, Gross Movement  (2-/5) poor minus  -MARTIN      Rt Knee Extension MMT, Gross Movement  (3+/5) fair plus  -MARTIN      Rt Knee Flexion MMT, Gross Movement  (1/5) trace  -MARTIN      Rt Ankle Plantarflexion MMT, Gross Movement  (0/5) zero  -MARTIN      Rt Ankle Dorsiflexion MMT, Gross Movement  (0/5) zero  -MARTIN      Rt Ankle Subtalar Inversion MT, Gross Movement  (0/5) zero  -MARTIN      Rt Ankle Subtalar Eversion MMT, Gross Movement  (0/5) zero  -MARTIN         MMT Left Lower Ext    Lt Lower Extremity Comments   overall 4+/5  -MARTIN         Tone    UE Tone  right:;Hypertonic flexed hand - getting Botox   -MARTIN      LE Tone  right:;Extensor Biased;Spastic hypertonia;Clonus  -MARTIN         Bed Mobility Assessment/Treatment    Bed Mobility Assessment/Treatment  rolling left;supine-sit;sit-supine  -MARTIN      Rolling Left [place] (Bed Mobility)  supervision to remember R UE   -MARTIN      Supine-Sit [place] (Bed Mobility)  conditional independence  -MARTIN      Sit-Supine [place] (Bed Mobility)  conditional independence  -MARTIN      Bed Mobility, Safety Issues  decreased use of arms for pushing/pulling;decreased use of legs for bridging/pushing  -MARTIN      Assistive Device (Bed Mobility)  -- pulled on edge of mat to assist to sit   -MARTIN         Transfers    Sit-Stand [place] (Transfers)  verbal cues;nonverbal cues (demo/gesture);stand by assist;contact guard  -MARTIN      Stand-Sit [place] (Transfers)  verbal cues;nonverbal cues (demo/gesture);stand by assist  -MARTIN      Transfers, Sit-Stand-Sit, Assist Device  quad cane SBQC & AFO/knee brace   -MARTIN      Transfer, Safety Issues   balance decreased during turns;sequencing ability decreased;step length decreased;weight-shifting ability decreased;impulsivity  -MARTIN      Transfer, Impairments  muscle tone abnormal;sensation decreased;strength decreased;impaired balance;impaired vision  -MARTIN      Comment (Transfers)  pt leans to L and weight bears L > R   -MARTIN         Gait/Stairs Assessment/Training    [place] Level (Gait)  verbal cues;stand by assist;contact guard  -MARTIN      Assistive Device (Gait)  cane, quad;AFO;other (see comments) SBQC and knee brace  -MARTIN      Distance in Feet (Gait)  70' x 2 with quad cane with AFO and knee brace; 70' x 1 with AFO but NO knee brace (more recurvatum R knee)  -MARTIN      Deviations/Abnormal Patterns (Gait)  bilateral deviations;gait speed decreased;base of support, wide;right sided deviations absent pushoff; longer step R   -MARTIN      Left Sided Gait Deviations  leans left;heel strike decreased shorter step L; step to ~ 50% of time   -MARTIN      Right Sided Gait Deviations  weight shift ability decreased;knee hyperextension;hip circumduction excessive hip flex in swing; slight knee flex; hip retractio  -MARTIN      [place] Level (Stairs)  contact guard  -MARTIN      Handrail Location (Stairs)  right side (ascending)  -MARTIN      Number of Steps (Stairs)  4  -MARTIN      Ascending Technique (Stairs)  step-to-step sidesteps up because rail at house on R   -MARTIN      Descending Technique (Stairs)  step-to-step forward facing; leads with L due to R tone   -MARTIN      Stairs, Safety Issues  balance decreased during turns;weight-shifting ability decreased  -MARTIN      Stairs, Impairments  muscle tone abnormal;sensation decreased;strength decreased;impaired balance;impaired vision  -MARTIN      Comment (Gait/Stairs)  Curb: with SBQC min of one   -MARTIN         Balance Skills Training    Standing-Level of Assistance  Close supervision;Contact guard  -MARTIN      Static Standing Balance Support  assistive device;No upper extremity supported  -MARTIN       Standing-Balance Activities  -- static stance -- all weight on L LE, kept reaching for cane   -MARTIN      Balance Comments  see Tinetti and TUG   -MARTIN         Orthotic/Prosthetic Management    Orthosis Location  AFO (ankle foot orthosis);knee orthosis  -MARTIN         Knee Orthosis Management    Type (Knee Orthosis)  right DonJoy knee brace to prevent recurvatum   -MARTIN      Therapeutic Indications (Knee Orthosis)  positioning for occupational performance/function;deformity prevention/reduction  -MARTIN      Wearing Schedule (Knee Orthosis)  wear with activity/work ambulation   -MARTIN      Orthosis Training (Knee Orthosis)  patient;donning/doffing orthosis pt is independent   -MARTIN         Ankle Foot Orthosis Management    Type (Ankle/Foot Orthosis)  right;AFO (ankle foot orthosis)  -MARTIN      Fabrication Comment (Ankle Foot Orthosis)  carbon fiber toe off AFO   -MARTIN      Functional Design (Ankle Foot Orthosis)  static orthosis  -MARTIN      Therapeutic Indications (Ankle Foot Orthosis)  compensation for lost function;positioning for occupational performance/function  -MARTIN      Wearing Schedule (Ankle Foot Orthosis)  wear with activity/work wears while ambulating   -MARTIN      Orthosis Training (Ankle Foot Orthosis)  patient;donning/doffing orthosis pt is independent   -MARTIN      Compliance/Wearing Issues (Ankle Foot Orthosis)  patient/caregiver comprehend rationale for orthosis  -MARTIN        User Key  (r) = Recorded By, (t) = Taken By, (c) = Cosigned By    Initials Name Provider Type    Jill Toth, PT Physical Therapist                  Therapy Education  Education Details: Perform sit to stand with reduced lean L, to stand more in midline. Encouraged pt to ambulate with quad cane at all times.   Given: Fall prevention and home safety, Mobility training  How Provided: Verbal, Demonstration  Provided to: Patient, Caregiver  Level of Understanding: Teach back education performed, Verbalized, Demonstrated    PT OP Goals     Row Name 07/17/19  "1800          PT Short Term Goals    STG Date to Achieve  08/14/19  -     STG 1  Pt will be independent with HEP to improve balance and mobility.   -     STG 2  Pt can stand on firm surface independently for 1 minute with more equal weight bearing without LOB nor device.   -     STG 3  Pt to come to stand with more equal weight bearing.   -     STG 4  Pt will ambulate with reduced R hip flexion in swing when he begins ambulation leading with L LE.   -     STG 5  Evaluate balance with ROJAS test   -     STG 6  Pt to complete the TUG test < or = to 23 seconds using SBQC.   -     STG 7  Further evaluate need for knee brace with AFO.   -        Long Term Goals    LTG Date to Achieve  10/16/19  -     LTG 1  Pt to score at least 16/28 on the Tinetti Balance test to indicate improved balance and reduce risk for falls.   -     LTG 2  Pt to complete the TUG test in < or = to 19 seconds to indicate improve balance with gait.   -     LTG 3  Pt to ascend/descend 4 steps using quad cane with SBA so that pt can ascend/descend any type of stairs in the community.  -     LTG 4  Pt to ascend/descend 6\" curb with SBA of one with quad cane.   -     LTG 5  Pt to ambulate up to 400' in the community with least restrictive device SBA/independently.   -     LTG 6  Pt to ambulate with equal step length 50% of the time.   -     LTG 7  Pt to ambulate with increased weight shift R.   -        Time Calculation    PT Goal Re-Cert Due Date  08/14/19  -       User Key  (r) = Recorded By, (t) = Taken By, (c) = Cosigned By    Initials Name Provider Type    Jill Toth PT Physical Therapist          PT Assessment/Plan     Row Name 07/17/19 0624          PT Assessment    Functional Limitations  Decreased safety during functional activities;Impaired gait;Limitation in home management;Limitations in community activities;Performance in leisure activities;Performance in self-care ADL;Limitations in functional " capacity and performance  -MARTIN     Impairments  Balance;Cognition;Gait;Endurance;Sensation;Muscle strength;Impaired postural alignment  -MARTIN     Assessment Comments  Pt is a 49 y/o male who is 2 years post a TBI due to L basal ganglia hemorrhage continued into intraparenchymal hemorrhage. Pt has deficits of significant decreased strength of R extremities, transfer, ambulation and balance along with absent sensation R side, aphasic and R visual field cut. His clinical presentation is evolving due to his many deficits. Pt is at risk for falls as indicated on the Tinetti (11/28) and TUG in 28 seconds. Pt was initially seen at [facility] for one appt where they specialize in orthopedic PT. Pt was transferred to outpt rehab PT at [facility] due to area of PT focus better meets pt's needs. Goals originally set by Dominique Low PT did not accurately reflect goals this neuro PT felt best for pt. Therefore goals were reset today. Pt will benefit from outpt PT to work on strengthening, stretching, transfers, ambulation , balance.   -MARTIN     Please refer to paper survey for additional self-reported information  Yes  -MARTIN     Rehab Potential  Good  -MARTIN     Patient/caregiver participated in establishment of treatment plan and goals  Yes  -MARTIN     Patient would benefit from skilled therapy intervention  Yes  -MARTIN        PT Plan    PT Frequency  2x/week  -MARTIN     Predicted Duration of Therapy Intervention (Therapy Eval)  for 12 weeks   -MARTIN     Planned CPT's?  PT NEUROMUSC RE-EDUCATION EA 15 MIN: [id number]  -MARTIN     Physical Therapy Interventions (Optional Details)  balance training;gait training;home exercise program;neuromuscular re-education;orthotic fitting/training;patient/family education;postural re-education;stair training;strengthening;stretching;transfer training  -MARTIN       User Key  (r) = Recorded By, (t) = Taken By, (c) = Cosigned By    Initials Name Provider Type    Jill Toth, PT Physical Therapist        "      Exercises     Row Name 07/17/19 1100             Subjective Comments    Subjective Comments  Pt said he wears his leg brace/AFO when up ambulating and uses quad cane but sometimes no device.   -MARTIN         Subjective Pain    Able to rate subjective pain?  yes  -MARTIN      Pre-Treatment Pain Level  0  -MARTIN      Post-Treatment Pain Level  0  -MARTIN        User Key  (r) = Recorded By, (t) = Taken By, (c) = Cosigned By    Initials Name Provider Type    Jill Toth, PT Physical Therapist                      Outcome Measure Options: Tinetti, Timed Up and Go (TUG)  Tinetti Assessment  Tinetti Assessment: yes  Sitting Balance: Steady,safe  Arises: Able, uses arms  Attempts to Rise: Able, requires > 1 attempt  Immediate Standing Balance (first 5 sec): Steady, with support  Standing Balance: Steady, stance > 4 inch IRENE & requires support  Sternal Nudge (feet close together): Begins to fall  Eyes Closed (feet close together): Unsteady  Turning 360 Degrees- Steps: Discontinuous steps  Turning 360 Degrees- Steadiness: Unsteady (staggers, grabs)  Sitting Down: Uses arms or not a smooth motion  Tinetti Balance Score: 6  Gait Initiation (immediate after told \"go\"): No hesitancy  Step Length- Right Swing: Right swing foot passes Left stance leg  Step Length- Left Swing: Left swing foot does not pass Right  Foot Clearance- Right Foot: Right foot completely clears floor  Foot Clearance- Left Foot: Left foot completely clears floor  Step Symmetry: Right and Left step length unequal  Step Continuity: Stop/discontinuity between steps  Path (excursion): Mild/moderate deviation or use of aid  Trunk: Marked sway or uses device  Base of Support: Heels apart  Gait Score: 5  Tinetti Total Score: 11  Tinetti Assistive Device: quad cane, other (see comments)(SBQC & AFO with knee brace )  Timed Up and Go (TUG)  TUG Test 1: 28 seconds(quad cane and AFO and knee brace )    Time Calculation:   Start Time: 1100  Stop Time: 1150  Time " Calculation (min): 50 min  Total Timed Code Minutes- PT: 50 minute(s)   Therapy Charges for Today     Code Description Service Date Service Provider Modifiers Qty    [id number] HC PT NEUROMUSC RE EDUCATION EA 15 MIN 7/17/2019 Jill Bryan, PT GP 3          PT G-Codes  Outcome Measure Options: Tinetti, Timed Up and Go (TUG)  Tinetti Total Score: 11  TUG Test 1: 28 seconds(quad cane and AFO and knee brace )         Jill Bryan, PT  7/17/2019

## 2019-07-25 ENCOUNTER — APPOINTMENT (OUTPATIENT)
Dept: PHYSICAL THERAPY | Facility: HOSPITAL | Age: 49
End: 2019-07-25

## 2019-07-29 ENCOUNTER — HOSPITAL ENCOUNTER (OUTPATIENT)
Dept: PHYSICAL THERAPY | Facility: HOSPITAL | Age: 49
Setting detail: THERAPIES SERIES
Discharge: HOME | End: 2019-07-29

## 2019-07-29 ENCOUNTER — APPOINTMENT (OUTPATIENT)
Dept: PHYSICAL THERAPY | Facility: HOSPITAL | Age: 49
End: 2019-07-29

## 2019-07-29 DIAGNOSIS — R53.1 RIGHT SIDED WEAKNESS: ICD-10-CM

## 2019-07-29 DIAGNOSIS — I69.398 ABNORMALITY OF GAIT FOLLOWING CEREBROVASCULAR ACCIDENT (CVA): Primary | ICD-10-CM

## 2019-07-29 DIAGNOSIS — R26.9 ABNORMALITY OF GAIT FOLLOWING CEREBROVASCULAR ACCIDENT (CVA): Primary | ICD-10-CM

## 2019-07-29 PROCEDURE — 97112 NEUROMUSCULAR REEDUCATION: CPT

## 2019-08-01 ENCOUNTER — HOSPITAL ENCOUNTER (OUTPATIENT)
Dept: PHYSICAL THERAPY | Facility: HOSPITAL | Age: 49
Setting detail: THERAPIES SERIES
Discharge: HOME | End: 2019-08-01

## 2019-08-01 DIAGNOSIS — I69.398 ABNORMALITY OF GAIT FOLLOWING CEREBROVASCULAR ACCIDENT (CVA): Primary | ICD-10-CM

## 2019-08-01 DIAGNOSIS — R26.9 ABNORMALITY OF GAIT FOLLOWING CEREBROVASCULAR ACCIDENT (CVA): Primary | ICD-10-CM

## 2019-08-01 DIAGNOSIS — R53.1 RIGHT SIDED WEAKNESS: ICD-10-CM

## 2019-08-01 PROCEDURE — 97112 NEUROMUSCULAR REEDUCATION: CPT

## 2019-08-01 NOTE — THERAPY TREATMENT NOTE
"    Outpatient Physical Therapy Neuro Treatment Note  [facility]     Patient Name: Trevor Baez  : 1970  MRN: [id number]  Today's Date: 2019      Visit Date: 2019    Visit Dx:    ICD-10-CM ICD-9-CM   1. Abnormality of gait following cerebrovascular accident (CVA) I69.398 438.89    R26.9 781.2   2. Right sided weakness R53.1 728.87       There is no problem list on file for this patient.          PT Neuro     Row Name 19 1300             Subjective Comments    Subjective Comments  No new c/o   -MARTIN         Precautions and Contraindications    Precautions/Limitations  fall precautions  -MARTIN      Precautions  Pt is aphasic -- more expressive, seems to understand but difficulty with word finding. Absent R side sensation. R visual field cut.   -MARTIN         Subjective Pain    Able to rate subjective pain?  yes  -MARTIN      Pre-Treatment Pain Level  0  -MARTIN      Post-Treatment Pain Level  0  -MARTIN         Posture/Observations    Posture/Observations Comments  Pt arrived ambulating with SBQC, R AFO (no knee brace) with caregiver from [facility] present.   -MARTIN         Transfers    Sit-Stand [place] (Transfers)  verbal cues;nonverbal cues (demo/gesture);stand by assist;contact guard  -MARTIN      Stand-Sit [place] (Transfers)  verbal cues;nonverbal cues (demo/gesture);stand by assist  -MARTIN      Transfers, Sit-Stand-Sit, Assist Device  quad cane SBQC & AFO  -MARTIN      Transfer, Safety Issues  balance decreased during turns;sequencing ability decreased;step length decreased;weight-shifting ability decreased;impulsivity  -MARTIN      Transfer, Impairments  muscle tone abnormal;sensation decreased;strength decreased;impaired balance;impaired vision  -MARTIN      Comment (Transfers)  Practiced sit to stand with pt more in midline and \"nose over toes\". Pt was able to do this but continued to need verbal cues.   -MARTIN         Gait/Stairs Assessment/Training    [place] Level (Gait)  verbal cues;stand by " "assist;contact guard  -MARTIN      Assistive Device (Gait)  cane, quad;other (see comments) SBQC   -MARTIN      Distance in Feet (Gait)  80' x 3 with his toe off AFO   -MARTIN      Deviations/Abnormal Patterns (Gait)  bilateral deviations;gait speed decreased;base of support, wide;right sided deviations absent pushoff; longer step R   -MARTIN      Left Sided Gait Deviations  leans left;heel strike decreased shorter step L; step to ~ 50% of time   -MARTIN      Right Sided Gait Deviations  weight shift ability decreased;knee hyperextension;hip circumduction excessive hip flex in swing; sl knee flex; hip retraction  -MARTIN      Comment (Gait/Stairs)  Trial of a custom molded AFO (not initially for pt) and posterior leaf AFO. Pt has severe R hyperextension with all AFOs. Ambulated ~ 20' each but with poor control of R ankle with inversion and increased footdrop.  Lowered cane 1\". Had pt to begin ambulation leading with L LE first and noted a little less excessive R hip flexion in swing.   -MARTIN         Balance Skills Training    Standing-Level of Assistance  Distant supervision  -MARTIN      Static Standing Balance Support  assistive device SBQC  -MARTIN      Standing-Balance Activities  -- cues to  midline.   -MARTIN      Gait Balance-Level of Assistance  Contact guard  -MARTIN      Gait Balance Support  cynthia bar  -MARTIN      Gait Balance Activities  side-stepping  -MARTIN        User Key  (r) = Recorded By, (t) = Taken By, (c) = Cosigned By    Initials Name Provider Type    Jill Toth PT Physical Therapist                  PT Assessment/Plan     Row Name 08/01/19 8952          PT Assessment    Assessment Comments  Pt's current AFO helps to hold his foot up more and does reduce inversion some but he has severe recurvatum. His AFO is a toe off which can promote more hyperextension of R knee.   -MARTIN       User Key  (r) = Recorded By, (t) = Taken By, (c) = Cosigned By    Initials Name Provider Type    Jill Toth, ABDULLAHI Physical Therapist       "       Exercises     Row Name 08/01/19 1300             Subjective Comments    Subjective Comments  No new c/o   -MARTIN         Subjective Pain    Able to rate subjective pain?  yes  -MARTIN      Pre-Treatment Pain Level  0  -MARTIN      Post-Treatment Pain Level  0  -MARTIN         Exercise 3    Exercise Name 3  Practiced sit to stand to be more in midline   -MARTIN      Reps 3  5  -MARTIN         Exercise 7    Exercise Name 7  Hemibars: partial knee bends.   -MARTIN      Reps 7  10  -MARTIN        User Key  (r) = Recorded By, (t) = Taken By, (c) = Cosigned By    Initials Name Provider Type    Jill Toth, PT Physical Therapist                          Therapy Education  Education Details: Cues to  midline, reduce lean to L. Worked on midline standing with cane.   Given: Posture/body mechanics  How Provided: Verbal, Demonstration  Provided to: Patient, Caregiver  Level of Understanding: Teach back education performed, Verbalized, Demonstrated              Time Calculation:   Start Time: 1300  Stop Time: 1345  Time Calculation (min): 45 min  Total Timed Code Minutes- PT: 45 minute(s)   Therapy Charges for Today     Code Description Service Date Service Provider Modifiers Qty    22433489602 HC PT NEUROMUSC RE EDUCATION EA 15 MIN 8/1/2019 Jill Bryan, PT GP 3                    Jill Bryan, PT  8/1/2019

## 2019-08-05 ENCOUNTER — HOSPITAL ENCOUNTER (OUTPATIENT)
Dept: PHYSICAL THERAPY | Facility: HOSPITAL | Age: 49
Setting detail: THERAPIES SERIES
Discharge: HOME | End: 2019-08-05

## 2019-08-05 DIAGNOSIS — I69.398 ABNORMALITY OF GAIT FOLLOWING CEREBROVASCULAR ACCIDENT (CVA): Primary | ICD-10-CM

## 2019-08-05 DIAGNOSIS — R26.9 ABNORMALITY OF GAIT FOLLOWING CEREBROVASCULAR ACCIDENT (CVA): Primary | ICD-10-CM

## 2019-08-05 DIAGNOSIS — R53.1 RIGHT SIDED WEAKNESS: ICD-10-CM

## 2019-08-05 PROCEDURE — 97112 NEUROMUSCULAR REEDUCATION: CPT

## 2019-08-05 NOTE — THERAPY TREATMENT NOTE
"    Outpatient Physical Therapy Neuro Treatment Note  [facility]     Patient Name: Trevor Baez  : 1970  MRN: [id number]  Today's Date: 2019      Visit Date: 2019    Visit Dx:    ICD-10-CM ICD-9-CM   1. Abnormality of gait following cerebrovascular accident (CVA) I69.398 438.89    R26.9 781.2   2. Right sided weakness R53.1 728.87       There is no problem list on file for this patient.          PT Neuro     Row Name 19 0936             Subjective Comments    Subjective Comments  Pt said he is trying to stand up while being more in midline. Thinks he would like his cane moved back up 1\".   -MARTIN         Precautions and Contraindications    Precautions/Limitations  fall precautions  -MARTIN      Precautions  Pt is aphasic -- more expressive, seems to understand but difficulty with word finding. Absent R side sensation. R visual field cut.   -MARTIN         Subjective Pain    Able to rate subjective pain?  yes  -AMRTIN      Pre-Treatment Pain Level  2  -MARTIN      Post-Treatment Pain Level  2  -MARTIN      Subjective Pain Comment  Low back   -MARTIN         Posture/Observations    Posture/Observations Comments  Pt arrived ambulating with SBQC, R AFO (no knee brace) with caregiver from [facility] present.   -MARTIN         Transfers    Sit-Stand [place] (Transfers)  verbal cues;nonverbal cues (demo/gesture);stand by assist;contact guard  -MARTIN      Stand-Sit [place] (Transfers)  verbal cues;nonverbal cues (demo/gesture);stand by assist  -MARTIN      Transfers, Sit-Stand-Sit, Assist Device  quad cane SBQC & AFO  -MARTIN      Transfer, Safety Issues  balance decreased during turns;sequencing ability decreased;step length decreased;weight-shifting ability decreased;impulsivity  -MARTIN      Transfer, Impairments  muscle tone abnormal;sensation decreased;strength decreased;impaired balance;impaired vision  -MARTIN      Comment (Transfers)  To sit down, 2x pt 1//2 turned and then sat down. Instructed pt to back up to sit " down.   -MARTIN         Gait/Stairs Assessment/Training    [place] Level (Gait)  verbal cues;stand by assist;contact guard  -MARTIN      Assistive Device (Gait)  cane, quad;other (see comments);AFO SBQC   -MARTIN      Distance in Feet (Gait)  100' x 2 with his toe off AFO. Cues for pt to narrow IRENE and begin ambulation leading with L foot first. Then ambulated with a custom molded AFO (sample one) for 100' but still with severe recurvatum.   -MARTIN      Deviations/Abnormal Patterns (Gait)  bilateral deviations;gait speed decreased;base of support, wide;right sided deviations absent pushoff; longer step R   -MARTIN      Left Sided Gait Deviations  leans left;heel strike decreased shorter step L; step to ~ 50% of time   -MARTIN      Right Sided Gait Deviations  weight shift ability decreased;knee hyperextension;hip circumduction excessive hip flex in swing; slight knee flex; hip retractio  -MARTIN      Comment (Gait/Stairs)  Ambulated at hemibars 20' x 3 with knees flexed and then tried this with quad cane.   -MARTIN         Balance Skills Training    Standing-Level of Assistance  Distant supervision  -      Static Standing Balance Support  assistive device SBQC  -MARTIN      Standing-Balance Activities  -- cues to  midline with static stance   -MARTIN         Ankle Foot Orthosis Management    Type (Ankle/Foot Orthosis)  right;AFO (ankle foot orthosis)  -MARTIN      Fabrication Comment (Ankle Foot Orthosis)  carbon fiber toe off AFO   -MARTIN      Functional Design (Ankle Foot Orthosis)  static orthosis  -MARTIN      Therapeutic Indications (Ankle Foot Orthosis)  compensation for lost function;positioning for occupational performance/function  -MARTIN      Wearing Schedule (Ankle Foot Orthosis)  wear with activity/work  -        User Key  (r) = Recorded By, (t) = Taken By, (c) = Cosigned By    Initials Name Provider Type    Jill Toth PT Physical Therapist                  PT Assessment/Plan     Row Name 08/05/19 2245          PT Assessment     "Assessment Comments  Pt understands and able to follow one step commands well. His biggest issue is his absent sensation R side and R visual field cut.   -MARTIN       User Key  (r) = Recorded By, (t) = Taken By, (c) = Cosigned By    Initials Name Provider Type    Jill Toth PT Physical Therapist             Exercises     Row Name 08/05/19 0936             Subjective Comments    Subjective Comments  Pt said he is trying to stand up while being more in midline. Thinks he would like his cane moved back up 1\".   -MARTIN         Subjective Pain    Able to rate subjective pain?  yes  -MARTIN      Pre-Treatment Pain Level  2  -MARTIN      Post-Treatment Pain Level  2  -MARTIN      Subjective Pain Comment  Low back   -MARTIN         Exercise 3    Exercise Name 3  Practiced sit to stand to be more in midline   -MARTIN      Reps 3  5  -MARTIN         Exercise 8    Exercise Name 8  Hemibars: held with L hand, L taps ups to 4\" step.   -MARTIN      Reps 8  10  -MARTIN         Exercise 9    Exercise Name 9  Pt stood on 4\" step at // bars (holding with L hand) and performed hip extension.   -MARTIN      Cueing 9  Verbal;Tactile;Demo  -MARTIN      Reps 9  10  -MARTIN      Additional Comments  assist to prevent trunk flexion during exercise.   -MARTIN        User Key  (r) = Recorded By, (t) = Taken By, (c) = Cosigned By    Initials Name Provider Type    Jill Toth PT Physical Therapist                                         Time Calculation:   Start Time: 0936  Stop Time: 1021  Time Calculation (min): 45 min   Therapy Charges for Today     Code Description Service Date Service Provider Modifiers Qty    [id number]  PT NEUROMUSC RE EDUCATION EA 15 MIN 8/5/2019 Jill Bryan, PT GP 3                    Jill Bryan PT  8/5/2019     "

## 2019-08-08 ENCOUNTER — APPOINTMENT (OUTPATIENT)
Dept: PHYSICAL THERAPY | Facility: HOSPITAL | Age: 49
End: 2019-08-08

## 2019-08-12 ENCOUNTER — APPOINTMENT (OUTPATIENT)
Dept: PHYSICAL THERAPY | Facility: HOSPITAL | Age: 49
End: 2019-08-12

## 2019-08-15 ENCOUNTER — APPOINTMENT (OUTPATIENT)
Dept: PHYSICAL THERAPY | Facility: HOSPITAL | Age: 49
End: 2019-08-15

## 2019-08-19 ENCOUNTER — APPOINTMENT (OUTPATIENT)
Dept: PHYSICAL THERAPY | Facility: HOSPITAL | Age: 49
End: 2019-08-19

## 2019-08-22 ENCOUNTER — APPOINTMENT (OUTPATIENT)
Dept: PHYSICAL THERAPY | Facility: HOSPITAL | Age: 49
End: 2019-08-22

## 2019-08-26 ENCOUNTER — APPOINTMENT (OUTPATIENT)
Dept: PHYSICAL THERAPY | Facility: HOSPITAL | Age: 49
End: 2019-08-26

## 2019-08-29 ENCOUNTER — APPOINTMENT (OUTPATIENT)
Dept: PHYSICAL THERAPY | Facility: HOSPITAL | Age: 49
End: 2019-08-29

## 2019-09-05 ENCOUNTER — APPOINTMENT (OUTPATIENT)
Dept: PHYSICAL THERAPY | Facility: HOSPITAL | Age: 49
End: 2019-09-05

## 2022-07-20 ENCOUNTER — APPOINTMENT (OUTPATIENT)
Dept: GENERAL RADIOLOGY | Facility: HOSPITAL | Age: 52
End: 2022-07-20

## 2022-07-20 PROCEDURE — 72100 X-RAY EXAM L-S SPINE 2/3 VWS: CPT | Performed by: FAMILY MEDICINE

## 2023-04-14 NOTE — THERAPY TREATMENT NOTE
Outpatient Physical Therapy Neuro Treatment Note  [facility]     Patient Name: Trevor Baez  : 1970  MRN: [id number]  Today's Date: 2019      Visit Date: 2019    Visit Dx:    ICD-10-CM ICD-9-CM   1. Abnormality of gait following cerebrovascular accident (CVA) I69.398 438.89    R26.9 781.2   2. Right sided weakness R53.1 728.87       There is no problem list on file for this patient.          PT Neuro     Row Name 19 0945             Subjective Comments    Subjective Comments  Pt went to visit his dad over the weekend. He arrived with a walking boot because he thought that would be better than AFO.   -MARTIN         Precautions and Contraindications    Precautions/Limitations  fall precautions  -      Precautions  Pt is aphasic, seems to understand but difficulty with word finding. Absent R side sensation. R visual field cut.   -MARTIN         Subjective Pain    Subjective Pain Comment  did not mention pain   -MARTIN         Posture/Observations    Posture/Observations Comments  Pt arrived ambulating with SBQC, R walking boot with caregiver from [facility] present.   -MARTIN         Bed Mobility Assessment/Treatment    Rolling Left [place] (Bed Mobility)  supervision  -MARTIN      Supine-Sit [place] (Bed Mobility)  conditional independence  -MARTIN      Sit-Supine [place] (Bed Mobility)  conditional independence  -MARTIN      Bed Mobility, Safety Issues  decreased use of arms for pushing/pulling;decreased use of legs for bridging/pushing  -         Transfers    Sit-Stand [place] (Transfers)  verbal cues;nonverbal cues (demo/gesture);stand by assist;contact guard  -MARTIN      Stand-Sit [place] (Transfers)  verbal cues;nonverbal cues (demo/gesture);stand by assist  -      Transfers, Sit-Stand-Sit, Assist Device  quad cane SBQC & walking boot   -MARTIN      Transfer, Safety Issues  balance decreased during turns;sequencing ability decreased;step length decreased;weight-shifting  ability decreased;impulsivity  -MARTIN      Transfer, Impairments  muscle tone abnormal;sensation decreased;strength decreased;impaired balance;impaired vision  -MARTIN      Comment (Transfers)  Cues for more equal weight bearing by leaning forward between toes.   -MARTIN         Gait/Stairs Assessment/Training    [place] Level (Gait)  verbal cues;stand by assist;contact guard  -MARTIN      Assistive Device (Gait)  cane, quad;other (see comments) walking boot, SBQC   -MARTIN      Distance in Feet (Gait)  70' x 2 with quad cane and walking boot   -MARTIN      Deviations/Abnormal Patterns (Gait)  bilateral deviations;gait speed decreased;base of support, wide;right sided deviations absent pushoff; longer step R   -MARTIN      Left Sided Gait Deviations  leans left;heel strike decreased shorter step L; step to ~ 50% of time   -MARTIN      Right Sided Gait Deviations  weight shift ability decreased;knee hyperextension;hip circumduction excessive hip flex in swing; slight knee flex; hip retractio  -MARTIN      Comment (Gait/Stairs)  Pt said he thought wearing a walking boot would be better than using his AFO and knee brace.   -MARTIN        User Key  (r) = Recorded By, (t) = Taken By, (c) = Cosigned By    Initials Name Provider Type    Jill Toth, PT Physical Therapist                  PT Assessment/Plan     Row Name 07/29/19 1230          PT Assessment    Assessment Comments  Pt apparently thought the walking boot would be better than using his AFO and knee brace. That is incorrect. Since he did that today, PT limited his gait because the walking boot is higher and made pt unlevel.   -MARTIN       User Key  (r) = Recorded By, (t) = Taken By, (c) = Cosigned By    Initials Name Provider Type    Jill Toth PT Physical Therapist             Exercises     Row Name 07/29/19 7585             Subjective Comments    Subjective Comments  Pt went to visit his dad over the weekend. He arrived with a walking boot because he thought that would be  better than AFO.   -MARTIN         Subjective Pain    Subjective Pain Comment  did not mention pain   -MARTIN         Exercise 2    Exercise Name 2  Supine: R hip abduction AAROM; R SAQ   -MARTIN      Cueing 2  Verbal;Tactile  -MARTIN      Sets 2  2  -MARTIN      Reps 2  10  -MARTIN         Exercise 3    Exercise Name 3  Practiced sit to stand to be more in midline   -MARTIN      Reps 3  4  -MARTIN         Exercise 4    Exercise Name 4  Perch sitting: hip flexion and LAQ   -MARTIN      Cueing 4  Verbal;Tactile;Demo  -MARTIN      Reps 4  10 each   -MARTIN      Additional Comments  Cues for upright posture durign exercise.   -MARTIN         Exercise 5    Exercise Name 5  Supine: B bridges feet on mat; then B bridge with legs straight on peanut ball and then B bridge with feet on peanut ball   -MARTIN      Cueing 5  Verbal;Tactile  -MARTIN      Sets 5  2  -MARTIN      Reps 5  10  -MARTIN      Additional Comments  no contraction of hamstrings.   -MARTIN         Exercise 6    Exercise Name 6  Seated R knee flexion -- absent active movement   -MARTIN         Exercise 7    Exercise Name 7  Hemibars: partial knee bends.   -MARTIN      Reps 7  10   -MARTIN        User Key  (r) = Recorded By, (t) = Taken By, (c) = Cosigned By    Initials Name Provider Type    MARTIN Jill Bryan, PT Physical Therapist                          Therapy Education  Education Details: Instructed pt and caregiver from [facility] to have pt wear his AFO and knee brace not walking boot. Worked on sit to stand more in midline  Given: Mobility training  How Provided: Verbal, Demonstration  Provided to: Patient, Caregiver  Level of Understanding: Teach back education performed, Verbalized, Demonstrated              Time Calculation:   Start Time: 0945  Stop Time: 1028  Time Calculation (min): 43 min  Total Timed Code Minutes- PT: 43 minute(s)   Therapy Charges for Today     Code Description Service Date Service Provider Modifiers Qty    [id number]  PT NEUROMUSC RE EDUCATION EA 15 MIN 7/29/2019 Jill Bryan, PT GP 3                     Jill Bryan, PT  7/29/2019

## 2023-10-16 ENCOUNTER — OFFICE VISIT (OUTPATIENT)
Dept: FAMILY MEDICINE CLINIC | Facility: CLINIC | Age: 53
End: 2023-10-16
Payer: MEDICAID

## 2023-10-16 VITALS
OXYGEN SATURATION: 98 % | WEIGHT: 193 LBS | HEIGHT: 68 IN | SYSTOLIC BLOOD PRESSURE: 110 MMHG | DIASTOLIC BLOOD PRESSURE: 70 MMHG | HEART RATE: 83 BPM | BODY MASS INDEX: 29.25 KG/M2 | RESPIRATION RATE: 16 BRPM

## 2023-10-16 DIAGNOSIS — G81.10 SPASTIC HEMIPLEGIA AFFECTING DOMINANT SIDE: Primary | ICD-10-CM

## 2023-10-16 DIAGNOSIS — Z12.11 SCREEN FOR COLON CANCER: ICD-10-CM

## 2023-10-16 PROBLEM — F90.0 ATTENTION DEFICIT HYPERACTIVITY DISORDER, PREDOMINANTLY INATTENTIVE TYPE: Status: ACTIVE | Noted: 2020-06-16

## 2023-10-16 PROBLEM — E78.2 MIXED HYPERLIPIDEMIA: Status: ACTIVE | Noted: 2018-05-30

## 2023-10-16 PROBLEM — F51.02 TRANSIENT INSOMNIA: Status: ACTIVE | Noted: 2018-05-30

## 2023-10-16 PROBLEM — I10 HYPERTENSIVE DISORDER: Status: ACTIVE | Noted: 2018-05-30

## 2023-10-16 PROBLEM — J30.2 SEASONAL ALLERGIES: Status: ACTIVE | Noted: 2018-09-06

## 2023-10-16 PROBLEM — R56.1 SEIZURE AFTER HEAD INJURY: Status: ACTIVE | Noted: 2018-05-30

## 2023-10-16 PROBLEM — F33.9 CHRONIC RECURRENT MAJOR DEPRESSIVE DISORDER: Status: ACTIVE | Noted: 2018-05-30

## 2023-10-16 PROBLEM — I62.9 INTRACRANIAL HEMORRHAGE: Status: ACTIVE | Noted: 2018-05-30

## 2023-10-16 PROCEDURE — 99203 OFFICE O/P NEW LOW 30 MIN: CPT

## 2023-10-16 PROCEDURE — 3074F SYST BP LT 130 MM HG: CPT

## 2023-10-16 PROCEDURE — 1160F RVW MEDS BY RX/DR IN RCRD: CPT

## 2023-10-16 PROCEDURE — 3078F DIAST BP <80 MM HG: CPT

## 2023-10-16 PROCEDURE — 1159F MED LIST DOCD IN RCRD: CPT

## 2023-10-16 RX ORDER — CYCLOBENZAPRINE HCL 10 MG
10 TABLET ORAL 3 TIMES DAILY PRN
COMMUNITY

## 2023-10-16 RX ORDER — FLUTICASONE PROPIONATE 50 MCG
2 SPRAY, SUSPENSION (ML) NASAL DAILY
COMMUNITY

## 2023-10-16 NOTE — PROGRESS NOTES
"Chief Complaint  Establish Care and History of stroke    Subjective         History of Present Illness    Trevor Baez 53 y.o. male presents today for a new patient appointment. He is here to establish care and is a new patient to me. I reviewed the PFSH recorded today by my MA/LPN staff.       The patient has a history of spastic hemiplegia CVA affecting his dominant side in May 2018.  He has some residual right-sided deficits.  No new deficits.  He also has a history of seizure following a head injury in 3-.  He has a history of one seizure in 2018 prior to the stroke.  No seizures since then.  He and his  states he does not take Clopidogrel daily.  He uses a cane at all times.  He reports he feels steady ambulating with his cane.    He is not established with Neurology.  He is a resident of an independent facility.  Medications are managed by the independent facility.  He receives outpatient therapy 2-3 days per week at [facility] facility.    He is asymptomatic today.  He reports he feels well overall.    He believes he had a colonoscopy at age of 40 years.  He denies a personal and family history of colon cancer, colitis, and chron's disease.  Will order a Cologuard test today.  He declined an Influenza vaccination.    His  Ankit Delatorre is in attendance for today's appointment with permission of the patient.          Objective   Vital Signs:   /70   Pulse 83   Resp 16   Ht 172.7 cm (68\")   Wt 87.5 kg (193 lb)   SpO2 98%   BMI 29.35 kg/m²      BMI is >= 25 and <30. (Overweight) The following options were offered after discussion;: exercise counseling/recommendations and nutrition counseling/recommendations        Physical Exam  Vitals and nursing note reviewed.   Constitutional:       General: He is not in acute distress.     Appearance: He is well-developed and overweight. He is not toxic-appearing or diaphoretic.   HENT:      Head: Normocephalic and atraumatic. " No right periorbital erythema or left periorbital erythema.      Nose: Nose normal.   Eyes:      General: No scleral icterus.        Right eye: No discharge.         Left eye: No discharge.      Conjunctiva/sclera: Conjunctivae normal.      Pupils: Pupils are equal, round, and reactive to light.   Neck:      Vascular: No carotid bruit.   Cardiovascular:      Rate and Rhythm: Normal rate and regular rhythm.      Heart sounds: Normal heart sounds. No murmur heard.  Pulmonary:      Effort: Pulmonary effort is normal.   Abdominal:      Palpations: Abdomen is soft.      Tenderness: There is no abdominal tenderness.   Musculoskeletal:         General: No tenderness or deformity. Normal range of motion.      Cervical back: Normal range of motion and neck supple.   Skin:     General: Skin is warm and dry.      Findings: No erythema or rash.   Neurological:      Mental Status: He is alert and oriented to person, place, and time.      Cranial Nerves: Dysarthria and facial asymmetry present. No cranial nerve deficit.      Sensory: Sensory deficit present.      Motor: Weakness present. No tremor or seizure activity.      Coordination: Coordination abnormal.      Gait: Gait is intact.      Comments: Chronic mild dysarthria and right sided facial asymmetry.  Chronic right sided weakness.  Speech is slightly slurred but clear.  Right lower extremity decreased sensation is not a new finding.  Gait is slow but steady with use of cane.   Psychiatric:         Behavior: Behavior normal.         Thought Content: Thought content normal.         Judgment: Judgment normal.                         Assessment and Plan      Diagnoses and all orders for this visit:    1. Spastic hemiplegia affecting dominant side (Primary)  Comments:  New patient  CVA in 2018, asymptomatic  BP, blood sugar, and cholesterol control  Labs ordered  Follow-up in 6 months    2. Screen for colon cancer  -     Cologuard - Stool, Per Rectum; Future            Follow  Up     Return in about 6 months (around 4/16/2024) for Next scheduled follow up.    Patient was given instructions and counseling regarding his condition or for health maintenance advice. Please see specific information pulled into the AVS if appropriate.     -Follow up in 6 months.

## 2023-11-09 LAB
ALBUMIN SERPL-MCNC: 5.1 G/DL (ref 3.5–5.2)
ALBUMIN/GLOB SERPL: 2.3 G/DL
ALP SERPL-CCNC: 77 U/L (ref 39–117)
ALT SERPL-CCNC: 41 U/L (ref 1–41)
AST SERPL-CCNC: 31 U/L (ref 1–40)
BASOPHILS # BLD AUTO: 0.04 10*3/MM3 (ref 0–0.2)
BASOPHILS NFR BLD AUTO: 0.5 % (ref 0–1.5)
BILIRUB SERPL-MCNC: 0.5 MG/DL (ref 0–1.2)
BUN SERPL-MCNC: 6 MG/DL (ref 6–20)
BUN/CREAT SERPL: 6.5 (ref 7–25)
CALCIUM SERPL-MCNC: 9.9 MG/DL (ref 8.6–10.5)
CHLORIDE SERPL-SCNC: 102 MMOL/L (ref 98–107)
CHOLEST SERPL-MCNC: 149 MG/DL (ref 0–200)
CO2 SERPL-SCNC: 27.8 MMOL/L (ref 22–29)
CREAT SERPL-MCNC: 0.92 MG/DL (ref 0.76–1.27)
EGFRCR SERPLBLD CKD-EPI 2021: 99.5 ML/MIN/1.73
EOSINOPHIL # BLD AUTO: 0.16 10*3/MM3 (ref 0–0.4)
EOSINOPHIL NFR BLD AUTO: 2.2 % (ref 0.3–6.2)
ERYTHROCYTE [DISTWIDTH] IN BLOOD BY AUTOMATED COUNT: 12.5 % (ref 12.3–15.4)
GLOBULIN SER CALC-MCNC: 2.2 GM/DL
GLUCOSE SERPL-MCNC: 97 MG/DL (ref 65–99)
HCT VFR BLD AUTO: 42.9 % (ref 37.5–51)
HDLC SERPL-MCNC: 27 MG/DL (ref 40–60)
HGB BLD-MCNC: 14.9 G/DL (ref 13–17.7)
IMM GRANULOCYTES # BLD AUTO: 0.03 10*3/MM3 (ref 0–0.05)
IMM GRANULOCYTES NFR BLD AUTO: 0.4 % (ref 0–0.5)
LDLC SERPL CALC-MCNC: 66 MG/DL (ref 0–100)
LYMPHOCYTES # BLD AUTO: 2.1 10*3/MM3 (ref 0.7–3.1)
LYMPHOCYTES NFR BLD AUTO: 28.8 % (ref 19.6–45.3)
MCH RBC QN AUTO: 31.8 PG (ref 26.6–33)
MCHC RBC AUTO-ENTMCNC: 34.7 G/DL (ref 31.5–35.7)
MCV RBC AUTO: 91.5 FL (ref 79–97)
MONOCYTES # BLD AUTO: 0.59 10*3/MM3 (ref 0.1–0.9)
MONOCYTES NFR BLD AUTO: 8.1 % (ref 5–12)
NEUTROPHILS # BLD AUTO: 4.37 10*3/MM3 (ref 1.7–7)
NEUTROPHILS NFR BLD AUTO: 60 % (ref 42.7–76)
NRBC BLD AUTO-RTO: 0 /100 WBC (ref 0–0.2)
PLATELET # BLD AUTO: 289 10*3/MM3 (ref 140–450)
POTASSIUM SERPL-SCNC: 5.1 MMOL/L (ref 3.5–5.2)
PROT SERPL-MCNC: 7.3 G/DL (ref 6–8.5)
RBC # BLD AUTO: 4.69 10*6/MM3 (ref 4.14–5.8)
SODIUM SERPL-SCNC: 138 MMOL/L (ref 136–145)
TRIGL SERPL-MCNC: 353 MG/DL (ref 0–150)
TSH SERPL DL<=0.005 MIU/L-ACNC: 6.09 UIU/ML (ref 0.27–4.2)
VLDLC SERPL CALC-MCNC: 56 MG/DL (ref 5–40)
WBC # BLD AUTO: 7.29 10*3/MM3 (ref 3.4–10.8)

## 2023-11-28 DIAGNOSIS — R79.89 ELEVATED TSH: Primary | ICD-10-CM

## 2024-04-26 ENCOUNTER — OFFICE VISIT (OUTPATIENT)
Dept: FAMILY MEDICINE CLINIC | Facility: CLINIC | Age: 54
End: 2024-04-26
Payer: MEDICAID

## 2024-04-26 VITALS
HEIGHT: 68 IN | TEMPERATURE: 98.3 F | SYSTOLIC BLOOD PRESSURE: 96 MMHG | DIASTOLIC BLOOD PRESSURE: 60 MMHG | BODY MASS INDEX: 29.58 KG/M2 | OXYGEN SATURATION: 97 % | WEIGHT: 195.2 LBS | HEART RATE: 84 BPM

## 2024-04-26 DIAGNOSIS — M25.471 RIGHT ANKLE SWELLING: Primary | ICD-10-CM

## 2024-04-26 PROCEDURE — 1159F MED LIST DOCD IN RCRD: CPT

## 2024-04-26 PROCEDURE — 1160F RVW MEDS BY RX/DR IN RCRD: CPT

## 2024-04-26 PROCEDURE — 3078F DIAST BP <80 MM HG: CPT

## 2024-04-26 PROCEDURE — 99213 OFFICE O/P EST LOW 20 MIN: CPT

## 2024-04-26 PROCEDURE — 3074F SYST BP LT 130 MM HG: CPT

## 2024-04-26 NOTE — PROGRESS NOTES
"Chief Complaint  Joint Swelling (Ankles )    Subjective          History of Present Illness    Trevor Baez 53 y.o. male presents today reporting right ankle swelling. Symptom onset was 2 weeks ago.  No injury or fall.    He started wearing a brace on the RLE one year ago to help with gait due to a CVA in 2018.    He receives OT and PT two days per week.  The patient's occupational Therapist sent a note stating that the patient's right lower extremity ankle swelling only occurs when he wears the ankle-foot orthosis.  The patient was informed by his occupational therapist that he needs to be more mobile to improve lower extremity swelling.  The patient does not wear the ankle-foot orthosis on the weekends.  He states he does not have any swelling when he is not wearing the orthotic device.  He does have chronic right lower extremity decreased sensation related to former CVA that has not worsened.  No erythema, warmth, pain, or calf pain.      Review of Systems   Constitutional:  Negative for appetite change, chills, fatigue and fever.   HENT:  Negative for congestion, sinus pressure and trouble swallowing.    Eyes:  Negative for visual disturbance.   Respiratory:  Negative for cough, chest tightness, shortness of breath and wheezing.    Cardiovascular:  Negative for chest pain, palpitations and leg swelling.   Gastrointestinal:  Negative for abdominal pain, constipation, diarrhea, nausea and vomiting.   Genitourinary:  Negative for dysuria, frequency and urgency.   Musculoskeletal:  Positive for joint swelling (Right ankle). Negative for arthralgias, gait problem, myalgias and neck pain.   Skin:  Negative for rash.   Neurological:  Negative for dizziness, syncope, weakness and light-headedness.   Psychiatric/Behavioral:  Negative for dysphoric mood. The patient is not nervous/anxious.         Objective   Vital Signs:   BP 96/60   Pulse 84   Temp 98.3 °F (36.8 °C)   Ht 172.7 cm (67.99\")   Wt 88.5 kg (195 lb 3.2 " oz)   SpO2 97%   BMI 29.69 kg/m²      BMI is >= 25 and <30. (Overweight) The following options were offered after discussion;: exercise counseling/recommendations and nutrition counseling/recommendations       Physical Exam  Vitals and nursing note reviewed.   Constitutional:       General: He is not in acute distress.     Appearance: He is well-developed and overweight. He is not toxic-appearing or diaphoretic.   HENT:      Head: Normocephalic and atraumatic. No right periorbital erythema or left periorbital erythema.   Eyes:      General: No scleral icterus.        Right eye: No discharge.         Left eye: No discharge.      Conjunctiva/sclera: Conjunctivae normal.      Pupils: Pupils are equal, round, and reactive to light.   Cardiovascular:      Rate and Rhythm: Normal rate and regular rhythm.      Pulses: Normal pulses.           Posterior tibial pulses are 2+ on the right side.   Pulmonary:      Effort: Pulmonary effort is normal. No respiratory distress.   Musculoskeletal:         General: Tenderness present. No deformity.      Right lower leg: Swelling (right ankle) present. No tenderness. 1+ No edema.      Left lower leg: No swelling. No edema.      Comments: Patient is wearing right ankle-foot orthosis.   Skin:     General: Skin is warm and dry.      Findings: No erythema or rash.   Neurological:      Mental Status: He is alert. Mental status is at baseline.      Cranial Nerves: Dysarthria and facial asymmetry present. No cranial nerve deficit.      Sensory: Sensory deficit (Decreased, RLE, chronic) present.      Motor: Weakness present. No tremor, atrophy, abnormal muscle tone or seizure activity.      Coordination: Coordination normal.      Gait: Gait is intact.      Comments: Chronic mild dysarthria and right sided facial asymmetry due to CVA in 2018.  Chronic right sided weakness.  Speech is slightly slurred but clear.  Right lower extremity decreased sensation is not a new finding.  Gait is slow  but steady with use of cane.   Psychiatric:         Behavior: Behavior normal.         Thought Content: Thought content normal.         Judgment: Judgment normal.                         Assessment and Plan      Assessment & Plan  Right ankle swelling  DME order for compression stockings  Start low-sodium diet  Keep legs elevated  Ambulate once every hour  Return if no improvement    Orders Placed This Encounter   Procedures    Compression Stockings                 Follow Up     Return if symptoms worsen or fail to improve.    Patient was given instructions and counseling regarding his condition or for health maintenance advice. Please see specific information pulled into the AVS if appropriate.

## 2024-09-09 DIAGNOSIS — R19.5 POSITIVE COLORECTAL CANCER SCREENING USING COLOGUARD TEST: Primary | ICD-10-CM

## 2024-10-03 ENCOUNTER — TELEPHONE (OUTPATIENT)
Dept: GASTROENTEROLOGY | Facility: CLINIC | Age: 54
End: 2024-10-03
Payer: MEDICAID

## 2024-10-03 NOTE — TELEPHONE ENCOUNTER
Referral for positive cologuard.    Patient has TBI and lives in facility.  Spoke with nursing.  Offered 10/08/2024, she states only can do couple appointment day due to staffing.    They has opening on 11/12/2024.  Scheduled at [place] at 12pm - arrive 11am.    Need to fax RX and prep instructions to Nursing at [phone].

## 2024-10-07 DIAGNOSIS — Z12.11 ENCOUNTER FOR COLORECTAL CANCER SCREENING USING COLOGUARD TEST: Primary | ICD-10-CM

## 2024-10-07 DIAGNOSIS — R19.5 POSITIVE COLORECTAL CANCER SCREENING USING COLOGUARD TEST: ICD-10-CM

## 2024-10-07 DIAGNOSIS — Z12.12 ENCOUNTER FOR COLORECTAL CANCER SCREENING USING COLOGUARD TEST: Primary | ICD-10-CM

## 2024-10-08 PROBLEM — R19.5 POSITIVE COLORECTAL CANCER SCREENING USING COLOGUARD TEST: Status: ACTIVE | Noted: 2024-10-07

## 2024-11-11 ENCOUNTER — ANESTHESIA EVENT (OUTPATIENT)
Dept: SURGERY | Facility: SURGERY CENTER | Age: 54
End: 2024-11-11
Payer: MEDICAID

## 2024-11-11 NOTE — PAT
Spoke with Deneen RN at [facility] on [address], pt is in assisted living at facility, walks with a cane due to a limp. Pt cognitively aware and with no memory loss. Transporter will bring and stay with pt,

## 2024-11-12 ENCOUNTER — HOSPITAL ENCOUNTER (OUTPATIENT)
Facility: SURGERY CENTER | Age: 54
Setting detail: HOSPITAL OUTPATIENT SURGERY
Discharge: HOME | End: 2024-11-12
Attending: INTERNAL MEDICINE | Admitting: INTERNAL MEDICINE
Payer: MEDICAID

## 2024-11-12 ENCOUNTER — ANESTHESIA (OUTPATIENT)
Dept: SURGERY | Facility: SURGERY CENTER | Age: 54
End: 2024-11-12
Payer: MEDICAID

## 2024-11-12 VITALS
HEART RATE: 76 BPM | WEIGHT: 194.2 LBS | TEMPERATURE: 98 F | SYSTOLIC BLOOD PRESSURE: 125 MMHG | OXYGEN SATURATION: 95 % | RESPIRATION RATE: 16 BRPM | BODY MASS INDEX: 28.76 KG/M2 | HEIGHT: 69 IN | DIASTOLIC BLOOD PRESSURE: 77 MMHG

## 2024-11-12 DIAGNOSIS — R19.5 POSITIVE COLORECTAL CANCER SCREENING USING COLOGUARD TEST: ICD-10-CM

## 2024-11-12 PROCEDURE — 25010000002 PROPOFOL 200 MG/20ML EMULSION: Performed by: NURSE ANESTHETIST, CERTIFIED REGISTERED

## 2024-11-12 PROCEDURE — 45385 COLONOSCOPY W/LESION REMOVAL: CPT | Performed by: INTERNAL MEDICINE

## 2024-11-12 PROCEDURE — 25010000002 LIDOCAINE PF 2% 2 % SOLUTION: Performed by: NURSE ANESTHETIST, CERTIFIED REGISTERED

## 2024-11-12 PROCEDURE — 45380 COLONOSCOPY AND BIOPSY: CPT | Performed by: INTERNAL MEDICINE

## 2024-11-12 PROCEDURE — 25010000002 PROPOFOL 10 MG/ML EMULSION: Performed by: NURSE ANESTHETIST, CERTIFIED REGISTERED

## 2024-11-12 PROCEDURE — 88305 TISSUE EXAM BY PATHOLOGIST: CPT | Performed by: INTERNAL MEDICINE

## 2024-11-12 DEVICE — REPLAY HEMOSTASIS CLIP, 16MM SPAN
Type: IMPLANTABLE DEVICE | Site: COLON | Status: FUNCTIONAL
Brand: REPLAY

## 2024-11-12 RX ORDER — PROPOFOL 10 MG/ML
INJECTION, EMULSION INTRAVENOUS AS NEEDED
Status: DISCONTINUED | OUTPATIENT
Start: 2024-11-12 | End: 2024-11-12 | Stop reason: SURG

## 2024-11-12 RX ORDER — METHYLPHENIDATE HYDROCHLORIDE 20 MG/1
TABLET ORAL
COMMUNITY
Start: 2024-04-20

## 2024-11-12 RX ORDER — BUSPIRONE HYDROCHLORIDE 10 MG/1
TABLET ORAL
COMMUNITY
Start: 2024-10-29

## 2024-11-12 RX ORDER — CALCIUM CARBONATE 500 MG/1
TABLET, CHEWABLE ORAL AS NEEDED
COMMUNITY
Start: 2024-04-22

## 2024-11-12 RX ORDER — SERTRALINE HYDROCHLORIDE 100 MG/1
1 TABLET, FILM COATED ORAL DAILY
COMMUNITY

## 2024-11-12 RX ORDER — POLYETHYLENE GLYCOL 3350 17 G/17G
POWDER, FOR SOLUTION ORAL
Qty: 238 G | Refills: 11 | Status: SHIPPED | OUTPATIENT
Start: 2024-11-12

## 2024-11-12 RX ORDER — LORATADINE 10 MG/1
TABLET ORAL
COMMUNITY
Start: 2024-10-29

## 2024-11-12 RX ORDER — FOLIC ACID 0.8 MG
TABLET ORAL
COMMUNITY
Start: 2024-10-29

## 2024-11-12 RX ORDER — POLYETHYLENE GLYCOL 3350 17 G/17G
POWDER, FOR SOLUTION ORAL
COMMUNITY
Start: 2024-11-08 | End: 2024-11-12

## 2024-11-12 RX ORDER — LIDOCAINE HYDROCHLORIDE 20 MG/ML
INJECTION, SOLUTION EPIDURAL; INFILTRATION; INTRACAUDAL; PERINEURAL AS NEEDED
Status: DISCONTINUED | OUTPATIENT
Start: 2024-11-12 | End: 2024-11-12 | Stop reason: SURG

## 2024-11-12 RX ADMIN — LIDOCAINE HYDROCHLORIDE 60 MG: 20 INJECTION, SOLUTION EPIDURAL; INFILTRATION; INTRACAUDAL; PERINEURAL at 13:18

## 2024-11-12 RX ADMIN — PROPOFOL 200 MCG/KG/MIN: 10 INJECTION, EMULSION INTRAVENOUS at 13:18

## 2024-11-12 RX ADMIN — PROPOFOL 100 MG: 10 INJECTION, EMULSION INTRAVENOUS at 13:20

## 2024-11-12 NOTE — H&P
Patient Care Team:  Arturo Flores DO as PCP - General (Physical Medicine and Rehabilitation)    CHIEF COMPLAINT: Screening CRC    HISTORY OF PRESENT ILLNESS:  First exam did do Gm whichwas positive. Hx of TBI.    Past Medical History:   Diagnosis Date    Hypertension     Positive colorectal cancer screening using Cologuard test     Stroke     traumatic brain injury resident at [facility].lives in assisted living     Past Surgical History:   Procedure Laterality Date    BRAIN SURGERY       History reviewed. No pertinent family history.  Social History     Tobacco Use    Smoking status: Never   Vaping Use    Vaping status: Never Used   Substance Use Topics    Alcohol use: No    Drug use: No     Medications Prior to Admission   Medication Sig Dispense Refill Last Dose/Taking    acetaminophen (TYLENOL) 325 MG tablet Take 2 tablets by mouth Every 6 (Six) Hours As Needed for Mild Pain.       amantadine (SYMMETREL) 100 MG capsule Take 1 capsule by mouth 2 (Two) Times a Day.       atorvastatin (LIPITOR) 40 MG tablet Take 1 tablet by mouth Daily.       baclofen (LIORESAL) 10 MG tablet Take 1 tablet by mouth 3 (Three) Times a Day.       coenzyme Q10 100 MG capsule Take 1 capsule by mouth Daily.       cyclobenzaprine (FLEXERIL) 10 MG tablet Take 1 tablet by mouth 3 (Three) Times a Day As Needed for Muscle Spasms.       docusate sodium (COLACE) 100 MG capsule Take 1 capsule by mouth 2 (Two) Times a Day.       fluticasone (FLONASE) 50 MCG/ACT nasal spray 2 sprays into the nostril(s) as directed by provider Daily.       folic acid (FOLVITE) 1 MG tablet Take 1 tablet by mouth Daily.       ibuprofen (ADVIL,MOTRIN) 600 MG tablet Take 1 tablet by mouth Every 6 (Six) Hours As Needed for Mild Pain . 20 tablet 0     labetalol (NORMODYNE) 200 MG tablet Take 1 tablet by mouth 2 (Two) Times a Day.       levETIRAcetam (KEPPRA) 500 MG tablet Take 1 tablet by mouth 2 (Two) Times a Day. 60 tablet 1      "losartan-hydrochlorothiazide (HYZAAR) 100-25 MG per tablet Take 1 tablet by mouth Daily.       methylPREDNISolone (MEDROL) 4 MG dose pack Take as directed on package instructions. 21 each 0     Multiple Vitamins-Minerals (THERA-M ENHANCED PO) Take  by mouth.       senna (SENOKOT) 8.6 MG tablet tablet Take  by mouth Every Night.       sertraline (ZOLOFT) 100 MG tablet Take 1 tablet by mouth Daily.       traZODone (DESYREL) 50 MG tablet Take 1 tablet by mouth Every Night.        Allergies:  Aspartame and phenylalanine    REVIEW OF SYSTEMS:  Please see the above history of present illness for pertinent positives and negatives.  The remainder of the patient's systems have been reviewed and are negative.     Vital Signs       Flowsheet Rows      Flowsheet Row First Filed Value   Admission Height 175.3 cm (69\") Documented at 11/11/2024 1413   Admission Weight 84.8 kg (187 lb) Documented at 11/11/2024 1413             Physical Exam:  Physical Exam   Constitutional: Patient appears well-developed and well-nourished and in no acute distress   HEENT:   Head: Normocephalic and atraumatic.   Eyes:  Pupils are equal, round, and reactive to light. EOM are intact. Sclerae are anicteric and non-injected.  Mouth and Throat: Patient has moist mucous membranes. Oropharynx is clear of any erythema or exudate.     Neck: Neck supple. No JVD present. No thyromegaly present. No lymphadenopathy present.  Cardiovascular: Regular rate, regular rhythm, S1 normal and S2 normal.  Exam reveals no gallop and no friction rub.  No murmur heard.  Pulmonary/Chest: Lungs are clear to auscultation bilaterally. No respiratory distress. No wheezes. No rhonchi. No rales.   Abdominal: Soft. Bowel sounds are normal. No distension and no mass. There is no hepatosplenomegaly. There is no tenderness.   Musculoskeletal: Normal Muscle tone  Extremities: No edema. Pulses are palpable in all 4 extremities.  Neurological: Patient is alert and oriented to person, " place, and time. Cranial nerves II-XII are grossly intact with no focal deficits.  Skin: Skin is warm. No rash noted. Nails show no clubbing.  No cyanosis or erythema.    Debilities/Disabilities Identified: None  Emotional Behavior: Appropriate     Results Review:   I reviewed the patient's new clinical results.    Lab Results (most recent)       None            Imaging Results (Most Recent)       None          reviewed    ECG/EMG Results (most recent)       None          reviewed    Assessment & Plan   Screening CRC/  colonoscopy      I discussed the patient's findings and my recommendations with patient.     Roque Child MD  11/12/24  11:45 EST    Time: 10 min prior to procedure.

## 2024-11-12 NOTE — ANESTHESIA POSTPROCEDURE EVALUATION
Patient: Trevor Baez    Procedure Summary       Date: 11/12/24 Room / Location: [facility]  / [facility]    Anesthesia Start: 1300 Anesthesia Stop: 1353    Procedure: COLONOSCOPY to Cecum with Polypectomy Diagnosis:       Positive colorectal cancer screening using Cologuard test      Colon polyps      (Positive colorectal cancer screening using Cologuard test [R19.5])    Surgeons: Roque Child MD Provider: Sukhjinder Becerril MD    Anesthesia Type: MAC ASA Status: 3            Anesthesia Type: MAC    Vitals  Vitals Value Taken Time   /77 11/12/24 1350   Temp 36.7 °C (98 °F) 11/12/24 1350   Pulse 76 11/12/24 1400   Resp 16 11/12/24 1400   SpO2 95 % 11/12/24 1400           Post Anesthesia Care and Evaluation    Patient location during evaluation: bedside  Patient participation: complete - patient participated  Level of consciousness: awake and alert  Pain management: adequate    Airway patency: patent  Anesthetic complications: No anesthetic complications  PONV Status: controlled  Cardiovascular status: blood pressure returned to baseline and acceptable  Respiratory status: acceptable  Hydration status: acceptable

## 2024-11-12 NOTE — ANESTHESIA PREPROCEDURE EVALUATION
Anesthesia Evaluation     Patient summary reviewed and Nursing notes reviewed   no history of anesthetic complications:   NPO Solid Status: > 8 hours  NPO Liquid Status: > 2 hours           Airway   Mallampati: II  TM distance: >3 FB  Neck ROM: full  Dental      Pulmonary    Cardiovascular     (+) hypertension, hyperlipidemia      Neuro/Psych  (+) seizures, CVA    ROS Comment: TBI  GI/Hepatic/Renal/Endo      Musculoskeletal     Abdominal    Substance History      OB/GYN          Other                    Anesthesia Plan    ASA 3     MAC     intravenous induction     Anesthetic plan, risks, benefits, and alternatives have been provided, discussed and informed consent has been obtained with: patient.    CODE STATUS:

## 2024-11-12 NOTE — BRIEF OP NOTE
COLONOSCOPY  Progress Note    Trevor Baez  11/12/2024    Pre-op Diagnosis:   Positive colorectal cancer screening using Cologuard test [R19.5]       Post-Op Diagnosis Codes:     * Positive colorectal cancer screening using Cologuard test [R19.5]     * Colon polyps [K63.5]    Procedure/CPT® Codes:        Procedure(s):  COLONOSCOPY to Cecum with Polypectomy              Surgeon(s):  Roque Child MD    Anesthesia: Monitored Anesthesia Care    Staff:   Endo Technician: Sandra Menezes  Endo Nurse: Stephanie Whitman RN         Estimated Blood Loss: none    Urine Voided: * No values recorded between 11/12/2024  1:00 PM and 11/12/2024  1:47 PM *    Specimens:                Specimens       ID Source Type Tests Collected By Collected At Frozen?    A Large Intestine, Right / Ascending Colon Tissue TISSUE PATHOLOGY EXAM   Roque Child MD 11/12/24 1328 No    Description: Ascending Polyp x2    This specimen was not marked as sent.    B Large Intestine, Transverse Colon Tissue TISSUE PATHOLOGY EXAM   Roque Child MD 11/12/24 1331 No    Description: Transverse Colon polyp x2    This specimen was not marked as sent.    C Large Intestine, Sigmoid Colon Tissue TISSUE PATHOLOGY EXAM   Roque Child MD 11/12/24 1334 No    Description: Sigmoid Polyp x2    This specimen was not marked as sent.    D Large Intestine, Sigmoid Colon Tissue TISSUE PATHOLOGY EXAM   Roque Child MD 11/12/24 1345 No    Description: Sigmoid Colon Polyp at 30 cm x2    This specimen was not marked as sent.                  Drains: * No LDAs found *    Findings: Colon to Cecum Good Prep  [id number] Snare x 3, Clip x 2, Biopsy        Complications: None          Roque Child MD     Date: 11/12/2024  Time: 13:49 EST

## 2024-11-12 NOTE — DISCHARGE INSTRUCTIONS
POST CLIP INSTRUCTIONS    The post polypectomy clip is a new device that has been developed to stop or prevent bleeding during GI endoscopic procedures. The device consists of a small metal clip that is attached to the end of an endoscope. The clip is designed to be deployed at the site of bleeding or site where possible bleeding could occur.  It compresses the blood vessel and stops the bleeding.      _______ clip(s) was placed in your body on ______________________ to control bleeding in your GI tract.  If scheduled for an MRI, please inform the technologist you should have an X-ray prior to your MRI to confirm the metal clip has passed.    POST CLIP INSTRUCTIONS

## 2024-11-14 LAB
CYTO UR: NORMAL
LAB AP CASE REPORT: NORMAL
PATH REPORT.FINAL DX SPEC: NORMAL
PATH REPORT.GROSS SPEC: NORMAL

## (undated) DEVICE — SYRINGE, LUER SLIP, STERILE, 60ML: Brand: MEDLINE

## (undated) DEVICE — THE SINGLE USE ETRAP – POLYP TRAP IS USED FOR SUCTION RETRIEVAL OF ENDOSCOPICALLY REMOVED POLYPS.: Brand: ETRAP

## (undated) DEVICE — SUREFIT, DUAL DISPERSIVE ELECTRODE, CONTACT QUALITY MONITOR: Brand: SUREFIT

## (undated) DEVICE — SINGLE-USE BIOPSY FORCEPS: Brand: RADIAL JAW 4

## (undated) DEVICE — JACKT LAB F/R KNIT CUFF/COLR XLG BLU

## (undated) DEVICE — ADAPT CLN SCPE ENDO PORPOISE BX/50 DISP

## (undated) DEVICE — KT ORCA ORCAPOD DISP STRL

## (undated) DEVICE — SINGLE-USE POLYPECTOMY SNARE: Brand: SENSATION SHORT THROW

## (undated) DEVICE — VIAL FORMLN CAP 10PCT 20ML

## (undated) DEVICE — FLEX ADVANTAGE 1500CC: Brand: FLEX ADVANTAGE

## (undated) DEVICE — Device

## (undated) DEVICE — CANN O2 ETCO2 FITS ALL CONN CO2 SMPL A/ 7IN DISP LF